# Patient Record
Sex: FEMALE | Race: WHITE | NOT HISPANIC OR LATINO
[De-identification: names, ages, dates, MRNs, and addresses within clinical notes are randomized per-mention and may not be internally consistent; named-entity substitution may affect disease eponyms.]

---

## 2017-09-07 ENCOUNTER — RESULT REVIEW (OUTPATIENT)
Age: 52
End: 2017-09-07

## 2017-09-07 ENCOUNTER — OUTPATIENT (OUTPATIENT)
Dept: OUTPATIENT SERVICES | Facility: HOSPITAL | Age: 52
LOS: 1 days | Discharge: HOME | End: 2017-09-07
Payer: COMMERCIAL

## 2017-09-07 VITALS
DIASTOLIC BLOOD PRESSURE: 87 MMHG | WEIGHT: 147.93 LBS | RESPIRATION RATE: 12 BRPM | TEMPERATURE: 98 F | HEIGHT: 66 IN | OXYGEN SATURATION: 100 % | SYSTOLIC BLOOD PRESSURE: 126 MMHG | HEART RATE: 59 BPM

## 2017-09-07 DIAGNOSIS — Z90.710 ACQUIRED ABSENCE OF BOTH CERVIX AND UTERUS: Chronic | ICD-10-CM

## 2017-09-07 PROCEDURE — 88305 TISSUE EXAM BY PATHOLOGIST: CPT | Mod: 26

## 2017-09-07 RX ORDER — SODIUM CHLORIDE 9 MG/ML
1000 INJECTION INTRAMUSCULAR; INTRAVENOUS; SUBCUTANEOUS
Qty: 0 | Refills: 0 | Status: DISCONTINUED | OUTPATIENT
Start: 2017-09-07 | End: 2017-09-22

## 2017-09-08 LAB — SURGICAL PATHOLOGY FINAL REPORT - CH: SIGNIFICANT CHANGE UP

## 2017-09-14 DIAGNOSIS — Z88.5 ALLERGY STATUS TO NARCOTIC AGENT: ICD-10-CM

## 2017-09-14 DIAGNOSIS — Z85.820 PERSONAL HISTORY OF MALIGNANT MELANOMA OF SKIN: ICD-10-CM

## 2017-09-14 DIAGNOSIS — R19.7 DIARRHEA, UNSPECIFIED: ICD-10-CM

## 2017-09-14 DIAGNOSIS — K92.1 MELENA: ICD-10-CM

## 2017-09-14 DIAGNOSIS — Z87.891 PERSONAL HISTORY OF NICOTINE DEPENDENCE: ICD-10-CM

## 2019-04-11 ENCOUNTER — EMERGENCY (EMERGENCY)
Facility: HOSPITAL | Age: 54
LOS: 0 days | Discharge: HOME | End: 2019-04-11
Attending: EMERGENCY MEDICINE | Admitting: EMERGENCY MEDICINE
Payer: COMMERCIAL

## 2019-04-11 VITALS
SYSTOLIC BLOOD PRESSURE: 119 MMHG | HEIGHT: 66 IN | RESPIRATION RATE: 19 BRPM | HEART RATE: 72 BPM | OXYGEN SATURATION: 100 % | DIASTOLIC BLOOD PRESSURE: 73 MMHG | WEIGHT: 128.97 LBS | TEMPERATURE: 98 F

## 2019-04-11 VITALS — WEIGHT: 128.97 LBS | HEIGHT: 66 IN
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
## 2019-04-11 DIAGNOSIS — R51 HEADACHE: ICD-10-CM

## 2019-04-11 DIAGNOSIS — Z90.710 ACQUIRED ABSENCE OF BOTH CERVIX AND UTERUS: Chronic | ICD-10-CM
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
## 2019-04-11 DIAGNOSIS — Z85.820 PERSONAL HISTORY OF MALIGNANT MELANOMA OF SKIN: ICD-10-CM

## 2019-04-11 DIAGNOSIS — Z79.891 LONG TERM (CURRENT) USE OF OPIATE ANALGESIC: ICD-10-CM

## 2019-04-11 DIAGNOSIS — S00.83XA CONTUSION OF OTHER PART OF HEAD, INITIAL ENCOUNTER: ICD-10-CM

## 2019-04-11 PROBLEM — C43.9 MALIGNANT MELANOMA OF SKIN, UNSPECIFIED: Chronic | Status: ACTIVE | Noted: 2017-09-07

## 2019-04-11 PROCEDURE — 76376 3D RENDER W/INTRP POSTPROCES: CPT | Mod: 26

## 2019-04-11 PROCEDURE — 99283 EMERGENCY DEPT VISIT LOW MDM: CPT

## 2019-04-11 PROCEDURE — 70486 CT MAXILLOFACIAL W/O DYE: CPT | Mod: 26

## 2019-04-11 NOTE — ED PROVIDER NOTE - ENMT, MLM
Airway patent, Nasal mucosa clear. Mouth with normal mucosa. Throat has no vesicles, no oropharyngeal exudates and uvula is midline. +mild nasal swelling, no septal hematoma, +?deviation of nose to the right +dried blood in nares. Neck supple.
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
## 2019-04-11 NOTE — ED ADULT NURSE NOTE - NSIMPLEMENTINTERV_GEN_ALL_ED
Implemented All Universal Safety Interventions:  [place] to call system. Call bell, personal items and telephone within reach. Instruct patient to call for assistance. Room bathroom lighting operational. Non-slip footwear when patient is off stretcher. Physically safe environment: no spills, clutter or unnecessary equipment. Stretcher in lowest position, wheels locked, appropriate side rails in place.

## 2019-04-11 NOTE — ED PROVIDER NOTE - CONSTITUTIONAL, MLM
normal... Well appearing, well nourished, awake, alert, oriented to person, place, time/situation and +mild distress
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
## 2019-04-11 NOTE — ED ADULT TRIAGE NOTE - PRO INTERPRETER NEED 2
Rest, limit activity  Ice to injured areas 2 to 3 times a day for 15-20 minutes  Antibiotic ointment to abrasions  Tylenol, or ibuprofen (Advil/Motrin), or try Aleve (please take with food) as needed  Comfortable footwear  Use Ace wrap while awake  Use crutches or cane to assist ambulation  Recheck/follow-up with family physician/Orthopedics  Milly Portillo  [phone]    Please go to the hospital emergency department if needed 
English

## 2019-04-11 NOTE — ED PROVIDER NOTE - NSFOLLOWUPINSTRUCTIONS_ED_ALL_ED_FT
Follow-up with your regular physician as necessary   Acetaminophen or ibuprofen for pain  Ice may help for the first 24-48h  Return with any recurrent/worsening symptoms.

## 2019-12-11 ENCOUNTER — APPOINTMENT (OUTPATIENT)
Dept: DERMATOLOGY | Facility: CLINIC | Age: 54
End: 2019-12-11

## 2020-01-05 ENCOUNTER — TRANSCRIPTION ENCOUNTER (OUTPATIENT)
Age: 55
End: 2020-01-05

## 2020-05-22 NOTE — ASU PATIENT PROFILE, ADULT - MEDICATIONS BROUGHT TO HOSPITAL, PROFILE
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 Infectious work up has been negative. Refractory to steroids. Plan to start Rituxan.  -Hematology - Recommending PRBC and planning on Rituxan tomorrow; Labs and CT chest without contrast to evaluate for lymphadenopathy; continue folic acid. no

## 2020-07-29 ENCOUNTER — TRANSCRIPTION ENCOUNTER (OUTPATIENT)
Age: 55
End: 2020-07-29

## 2020-08-22 ENCOUNTER — EMERGENCY (EMERGENCY)
Facility: HOSPITAL | Age: 55
LOS: 0 days | Discharge: HOME | End: 2020-08-22
Attending: EMERGENCY MEDICINE
Payer: COMMERCIAL

## 2020-08-22 VITALS
RESPIRATION RATE: 18 BRPM | DIASTOLIC BLOOD PRESSURE: 96 MMHG | HEART RATE: 77 BPM | TEMPERATURE: 99 F | OXYGEN SATURATION: 99 % | SYSTOLIC BLOOD PRESSURE: 164 MMHG

## 2020-08-22 VITALS — HEIGHT: 66 IN | WEIGHT: 160.06 LBS

## 2020-08-22 DIAGNOSIS — S61.012A LACERATION WITHOUT FOREIGN BODY OF LEFT THUMB WITHOUT DAMAGE TO NAIL, INITIAL ENCOUNTER: ICD-10-CM

## 2020-08-22 DIAGNOSIS — Z88.5 ALLERGY STATUS TO NARCOTIC AGENT: ICD-10-CM
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
## 2020-08-22 DIAGNOSIS — Z90.710 ACQUIRED ABSENCE OF BOTH CERVIX AND UTERUS: Chronic | ICD-10-CM

## 2020-08-22 DIAGNOSIS — Z85.820 PERSONAL HISTORY OF MALIGNANT MELANOMA OF SKIN: ICD-10-CM

## 2020-08-22 PROCEDURE — 99282 EMERGENCY DEPT VISIT SF MDM: CPT

## 2020-08-22 NOTE — ED STATDOCS - NSFOLLOWUPINSTRUCTIONS_ED_ALL_ED_FT
Take tylenol 650mg every 6 hours as needed for pain.  Keep hand elevated as much as possible.  Keep dressing in place and keep dry.  Have your doctor check wound in 2-3 days.          SKIN AVULSION - AfterCare(R) Instructions(ER/ED)     Skin Avulsion    WHAT YOU NEED TO KNOW:    Skin avulsion is a wound that happens when skin is torn from your body during an accident or other injury. The torn skin may be lost or too damaged to be repaired, and it must be removed. A wound of this type cannot be stitched closed because there is tissue missing. Avulsion wounds are usually bigger and have more scars because of the missing tissue.    DISCHARGE INSTRUCTIONS:    Medicines:     Antibiotic ointment: Your healthcare provider may tell you to gently rub a topical antibiotic ointment on your wound. This will help prevent an infection and help your wound heal faster.       Pain medicine: You may be given medicine to take away or decrease pain. Do not wait until the pain is severe before you take your medicine.      NSAIDs, such as ibuprofen, help decrease swelling, pain, and fever. This medicine is available with or without a doctor's order. NSAIDs can cause stomach bleeding or kidney problems in certain people. If you take blood thinner medicine, always ask if NSAIDs are safe for you. Always read the medicine label and follow directions. Do not give these medicines to children under 6 months of age without direction from your child's healthcare provider.      Take your medicine as directed. Contact your healthcare provider if you think your medicine is not helping or if you have side effects. Tell him of her if you are allergic to any medicine. Keep a list of the medicines, vitamins, and herbs you take. Include the amounts, and when and why you take them. Bring the list or the pill bottles to follow-up visits. Carry your medicine list with you in case of an emergency.    Care for your wound: Avulsion wounds may take longer to heal because they cannot be closed with tape or stitches. Keep your wound clean and protected to prevent infection and speed healing.     Clean your wound: Wash your hands with soap and water before and after you care for your wound. You may be able to use a soft cloth to gently clean the wound after the first 24 to 48 hours. After that, gently clean the wound once or twice a day with cool water. Do not soak your wound. Use soap to clean around the wound, but try not to get any on the wound itself. Do not use alcohol or hydrogen peroxide to clean your wound unless you are directed to. Gently pat the area dry and reapply the bandage as directed.       Elevate your wound: Prop your injured area on pillows to raise it above the level of your heart. This will help reduce pain and swelling. Do this for 30 minutes at a time, as often as you can.       Bandage your wound: Bandages keep your wound clean, dry, and protected from infection. They may also prevent swelling. Use a bandage that does not stick to your wound, and has a spongy layer to absorb fluids. Leave your bandage on as long as directed. Ask your healthcare provider when and how to change your bandage. Do not wrap the bandage too tightly. This could cut off blood flow and cause more injury.       Use cool compresses: Wet a washcloth or towel with cool water and hold it on your wound as directed. Ask how often to apply the compress and for how long each time.      Reduce scarring: Avoid direct sunlight on your wound. Sunlight may burn or change the color of the new skin over your wound. Use sunscreen (SPF 30 or higher) on the new skin for at least 1 year after it heals.    Support for leg and arm wounds: You may need to use crutches if the wound is on your leg. You may need to use a sling if the wound is on your arm. Crutches and slings help protect the injured area, prevent further injury, and heal the area in the right position.     Follow up with your healthcare provider within 2 days or as directed: If you have stitches, ask when to return to have them removed. Write down your questions so you remember to ask them during your visits.     Contact your healthcare provider if:     You have new pain, or it gets worse.       You have trouble moving the injured body area.       Your wound splits open or does not seem to be healing.    Return to the emergency department if:     You have a fever.       You have painful swelling, redness, or warmth around your wound.      Your wound is red and there are red streaks on your skin starting at your wound and moving upward.       Your wound is draining pus.       You have heavy bleeding or bleeding that does not stop after 10 minutes of holding firm, direct pressure over the wound.      You feel like there is an object stuck in your wound.

## 2020-08-22 NOTE — ED ADULT NURSE NOTE - NSIMPLEMENTINTERV_GEN_ALL_ED
Pt will ask psychiatrist re: increasing Wellbutrin dose 100 to 150 mg 2x/day and ADD medication (Strattera).   
Implemented All Universal Safety Interventions:  [place] to call system. Call bell, personal items and telephone within reach. Instruct patient to call for assistance. Room bathroom lighting operational. Non-slip footwear when patient is off stretcher. Physically safe environment: no spills, clutter or unnecessary equipment. Stretcher in lowest position, wheels locked, appropriate side rails in place.

## 2020-08-22 NOTE — ED STATDOCS - ATTENDING CONTRIBUTION TO CARE
Attending Contribution to Care: I, Talya Rodriguez, performed the initial face to face bedside interview with this patient regarding history of present illness, review of symptoms and relevant past medical, social and family history.  I completed an independent physical examination.  I was the initial provider who evaluated this patient. I have signed out the follow up of any pending tests (i.e. labs, radiological studies) to the ACP.  I have communicated the patient’s plan of care and disposition with the ACP.

## 2020-08-22 NOTE — ED STATDOCS - PATIENT PORTAL LINK FT
You can access the FollowMyHealth Patient Portal offered by [facility] by registering at the following website: [email or web address]. By joining [employer]’s FollowMyHealth portal, you will also be able to view your health information using other applications (apps) compatible with our system.

## 2020-08-22 NOTE — ED STATDOCS - OBJECTIVE STATEMENT
55 y/o female presents to ED with laceration with razor on left thumb. Pt states she was switching razor blades and pushed hard onto the razor, resulting in laceration to left thumb. Last tetanus shot 5 years ago.

## 2020-08-22 NOTE — ED ADULT NURSE NOTE - OBJECTIVE STATEMENT
Patient states that she has laceration to left thumb cut with razor approximately 2 hours ago.  Patient is still currently bleeding.

## 2020-08-22 NOTE — ED STATDOCS - SKIN, MLM
skin normal color for race, warm, dry and intact. +radial side of left thumb over distal phalanx subcentimeter deep skin avulsion with active bleeding, circular

## 2020-08-22 NOTE — ED STATDOCS - CLINICAL SUMMARY MEDICAL DECISION MAKING FREE TEXT BOX
deep skin avulsion from razor, localized wound care and outpt wound check recommended, pt already UTD on vaccinations, pt denies pain medications as she is going out after.

## 2020-08-22 NOTE — ED STATDOCS - PROGRESS NOTE DETAILS
55 y/o Female with skin avulsion to left thumb s/p trying to change the blade on her razor tonight.  avulsion to medial tip of left thumb still bleeding.  Applied Surgifoam to area and applied pressure dressing.  Will elevate.  F/U in 2-3 days for wound re-eval./ dressing removal.  Mable Treadwell PA-C

## 2020-10-15 ENCOUNTER — EMERGENCY (EMERGENCY)
Facility: HOSPITAL | Age: 55
LOS: 0 days | Discharge: HOME | End: 2020-10-15
Payer: COMMERCIAL

## 2020-10-15 VITALS
RESPIRATION RATE: 16 BRPM | TEMPERATURE: 99 F | HEIGHT: 66 IN | OXYGEN SATURATION: 97 % | HEART RATE: 65 BPM | DIASTOLIC BLOOD PRESSURE: 62 MMHG | SYSTOLIC BLOOD PRESSURE: 113 MMHG

## 2020-10-15 DIAGNOSIS — R53.83 OTHER FATIGUE: ICD-10-CM

## 2020-10-15 DIAGNOSIS — Z88.5 ALLERGY STATUS TO NARCOTIC AGENT: ICD-10-CM

## 2020-10-15 DIAGNOSIS — Z90.710 ACQUIRED ABSENCE OF BOTH CERVIX AND UTERUS: Chronic | ICD-10-CM

## 2020-10-15 PROCEDURE — U0003: CPT

## 2020-10-15 PROCEDURE — 99283 EMERGENCY DEPT VISIT LOW MDM: CPT

## 2020-10-15 NOTE — ED STATDOCS - OBJECTIVE STATEMENT
Pt presents to ED with fatigue x 2 days. pt denies any chest pain, sob, dyspnea, abdominal pain, nausea, vomiting or any other complaints.   Pt concerned for possible COVID-19.   Pt here for testing.

## 2020-10-15 NOTE — ED STATDOCS - CLINICAL SUMMARY MEDICAL DECISION MAKING FREE TEXT BOX
patient presents with fatigue and concern for COVID exposure.  As patient is nontoxic appearing will test for COVID and d/c.  Quarantine reviewed and return precautions reviewed.

## 2020-10-15 NOTE — ED STATDOCS - PATIENT PORTAL LINK FT
You can access the FollowMyHealth Patient Portal offered by [facility] by registering at the following website: [email or web address]. By joining [employer]’s FollowMyHealth portal, you will also be able to view your health information using other applications (apps) compatible with our system.

## 2020-10-15 NOTE — ED STATDOCS - PHYSICAL EXAMINATION
Constitutional: NAD AAOx3. Nontoxic, well appearing. Speaking full sentences  w/o distress  Eyes: EOMI, pupils equal  Head: Normocephalic atraumatic  Mouth: no airway obstruction  Cardiac: [id number]   Resp: Lungs CTAB  GI: Abd s/nt/nd  Neuro: motor and sensory intact

## 2020-10-15 NOTE — ED STATDOCS - NS ED ROS FT
ROS:   Constitutional- no fever, no chills.  + fatigue  ENT- no rhinorrhea, no sore throat, no congestion.    Cardiac- no chest pain, no palpitations,   Respiratory- no cough, no SOB    Abdomen- No nausea, no vomiting, no diarrhea.    Urinary- no dysuria, no urgency, no frequency.    Skin- No rashes

## 2020-10-16 LAB — SARS-COV-2 RNA SPEC QL NAA+PROBE: SIGNIFICANT CHANGE UP

## 2021-03-17 ENCOUNTER — OUTPATIENT (OUTPATIENT)
Dept: OUTPATIENT SERVICES | Facility: HOSPITAL | Age: 56
LOS: 1 days | End: 2021-03-17
Payer: COMMERCIAL

## 2021-03-17 DIAGNOSIS — Z20.828 CONTACT WITH AND (SUSPECTED) EXPOSURE TO OTHER VIRAL COMMUNICABLE DISEASES: ICD-10-CM

## 2021-03-17 DIAGNOSIS — Z90.710 ACQUIRED ABSENCE OF BOTH CERVIX AND UTERUS: Chronic | ICD-10-CM

## 2021-03-17 LAB — SARS-COV-2 RNA SPEC QL NAA+PROBE: DETECTED

## 2021-03-17 PROCEDURE — C9803: CPT

## 2021-03-17 PROCEDURE — U0005: CPT

## 2021-03-17 PROCEDURE — U0003: CPT

## 2021-03-18 DIAGNOSIS — Z20.828 CONTACT WITH AND (SUSPECTED) EXPOSURE TO OTHER VIRAL COMMUNICABLE DISEASES: ICD-10-CM

## 2021-04-05 NOTE — ED STATDOCS - NSTIMEPROVIDERCAREINITIATE_GEN_ER
Zheneliseo Guillory may take hydroxzyzine 10mg every 8 hours as needed for racing thoughts. Followup with the psychology resources provided by [facility]. 22-Aug-2020 20:02

## 2021-04-12 ENCOUNTER — TRANSCRIPTION ENCOUNTER (OUTPATIENT)
Age: 56
End: 2021-04-12

## 2021-06-13 ENCOUNTER — EMERGENCY (EMERGENCY)
Facility: HOSPITAL | Age: 56
LOS: 0 days | Discharge: HOME | End: 2021-06-13
Attending: EMERGENCY MEDICINE
Payer: COMMERCIAL

## 2021-06-13 VITALS
SYSTOLIC BLOOD PRESSURE: 127 MMHG | HEART RATE: 65 BPM | OXYGEN SATURATION: 97 % | RESPIRATION RATE: 18 BRPM | TEMPERATURE: 98 F | DIASTOLIC BLOOD PRESSURE: 82 MMHG

## 2021-06-13 VITALS — WEIGHT: 162.04 LBS | HEIGHT: 66 IN

## 2021-06-13 DIAGNOSIS — R10.32 LEFT LOWER QUADRANT PAIN: ICD-10-CM
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
## 2021-06-13 DIAGNOSIS — Z85.820 PERSONAL HISTORY OF MALIGNANT MELANOMA OF SKIN: ICD-10-CM

## 2021-06-13 DIAGNOSIS — Z88.4 ALLERGY STATUS TO ANESTHETIC AGENT: ICD-10-CM

## 2021-06-13 DIAGNOSIS — Z90.710 ACQUIRED ABSENCE OF BOTH CERVIX AND UTERUS: Chronic | ICD-10-CM
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
## 2021-06-13 DIAGNOSIS — T81.49XA INFECTION FOLLOWING A PROCEDURE, OTHER SURGICAL SITE, INITIAL ENCOUNTER: ICD-10-CM

## 2021-06-13 DIAGNOSIS — E06.3 AUTOIMMUNE THYROIDITIS: ICD-10-CM

## 2021-06-13 LAB
ALBUMIN SERPL ELPH-MCNC: 4.1 G/DL — SIGNIFICANT CHANGE UP (ref 3.3–5)
ALP SERPL-CCNC: 67 U/L — SIGNIFICANT CHANGE UP (ref 40–120)
ALT FLD-CCNC: 40 U/L — SIGNIFICANT CHANGE UP (ref 12–78)
ANION GAP SERPL CALC-SCNC: 3 MMOL/L — LOW (ref 5–17)
APPEARANCE UR: CLEAR — SIGNIFICANT CHANGE UP
APTT BLD: 30.9 SEC — SIGNIFICANT CHANGE UP (ref 27.5–35.5)
AST SERPL-CCNC: 22 U/L — SIGNIFICANT CHANGE UP (ref 15–37)
BASOPHILS # BLD AUTO: 0.04 K/UL — SIGNIFICANT CHANGE UP (ref 0–0.2)
BASOPHILS NFR BLD AUTO: 0.7 % — SIGNIFICANT CHANGE UP (ref 0–2)
BILIRUB SERPL-MCNC: 0.3 MG/DL — SIGNIFICANT CHANGE UP (ref 0.2–1.2)
BILIRUB UR-MCNC: NEGATIVE — SIGNIFICANT CHANGE UP
BUN SERPL-MCNC: 21 MG/DL — SIGNIFICANT CHANGE UP (ref 7–23)
CALCIUM SERPL-MCNC: 9.3 MG/DL — SIGNIFICANT CHANGE UP (ref 8.5–10.1)
CHLORIDE SERPL-SCNC: 108 MMOL/L — SIGNIFICANT CHANGE UP (ref 96–108)
CO2 SERPL-SCNC: 30 MMOL/L — SIGNIFICANT CHANGE UP (ref 22–31)
COLOR SPEC: YELLOW — SIGNIFICANT CHANGE UP
CREAT SERPL-MCNC: 0.64 MG/DL — SIGNIFICANT CHANGE UP (ref 0.5–1.3)
DIFF PNL FLD: ABNORMAL
EOSINOPHIL # BLD AUTO: 0.32 K/UL — SIGNIFICANT CHANGE UP (ref 0–0.5)
EOSINOPHIL NFR BLD AUTO: 5.8 % — SIGNIFICANT CHANGE UP (ref 0–6)
GLUCOSE SERPL-MCNC: 69 MG/DL — LOW (ref 70–99)
GLUCOSE UR QL: NEGATIVE MG/DL — SIGNIFICANT CHANGE UP
HCG SERPL-ACNC: 2 MIU/ML — SIGNIFICANT CHANGE UP
HCT VFR BLD CALC: 38.6 % — SIGNIFICANT CHANGE UP (ref 34.5–45)
HGB BLD-MCNC: 12.3 G/DL — SIGNIFICANT CHANGE UP (ref 11.5–15.5)
IMM GRANULOCYTES NFR BLD AUTO: 0.2 % — SIGNIFICANT CHANGE UP (ref 0–1.5)
INR BLD: 1.04 RATIO — SIGNIFICANT CHANGE UP (ref 0.88–1.16)
KETONES UR-MCNC: NEGATIVE — SIGNIFICANT CHANGE UP
LEUKOCYTE ESTERASE UR-ACNC: NEGATIVE — SIGNIFICANT CHANGE UP
LYMPHOCYTES # BLD AUTO: 1.71 K/UL — SIGNIFICANT CHANGE UP (ref 1–3.3)
LYMPHOCYTES # BLD AUTO: 30.9 % — SIGNIFICANT CHANGE UP (ref 13–44)
MCHC RBC-ENTMCNC: 29.5 PG — SIGNIFICANT CHANGE UP (ref 27–34)
MCHC RBC-ENTMCNC: 31.9 GM/DL — LOW (ref 32–36)
MCV RBC AUTO: 92.6 FL — SIGNIFICANT CHANGE UP (ref 80–100)
MONOCYTES # BLD AUTO: 0.56 K/UL — SIGNIFICANT CHANGE UP (ref 0–0.9)
MONOCYTES NFR BLD AUTO: 10.1 % — SIGNIFICANT CHANGE UP (ref 2–14)
NEUTROPHILS # BLD AUTO: 2.89 K/UL — SIGNIFICANT CHANGE UP (ref 1.8–7.4)
NEUTROPHILS NFR BLD AUTO: 52.3 % — SIGNIFICANT CHANGE UP (ref 43–77)
NITRITE UR-MCNC: NEGATIVE — SIGNIFICANT CHANGE UP
PH UR: 6 — SIGNIFICANT CHANGE UP (ref 5–8)
PLATELET # BLD AUTO: 268 K/UL — SIGNIFICANT CHANGE UP (ref 150–400)
POTASSIUM SERPL-MCNC: 3.6 MMOL/L — SIGNIFICANT CHANGE UP (ref 3.5–5.3)
POTASSIUM SERPL-SCNC: 3.6 MMOL/L — SIGNIFICANT CHANGE UP (ref 3.5–5.3)
PROT SERPL-MCNC: 8.1 GM/DL — SIGNIFICANT CHANGE UP (ref 6–8.3)
PROT UR-MCNC: NEGATIVE MG/DL — SIGNIFICANT CHANGE UP
PROTHROM AB SERPL-ACNC: 12 SEC — SIGNIFICANT CHANGE UP (ref 10.6–13.6)
RBC # BLD: 4.17 M/UL — SIGNIFICANT CHANGE UP (ref 3.8–5.2)
RBC # FLD: 13.1 % — SIGNIFICANT CHANGE UP (ref 10.3–14.5)
SODIUM SERPL-SCNC: 141 MMOL/L — SIGNIFICANT CHANGE UP (ref 135–145)
SP GR SPEC: 1.01 — SIGNIFICANT CHANGE UP (ref 1.01–1.02)
UROBILINOGEN FLD QL: NEGATIVE MG/DL — SIGNIFICANT CHANGE UP
WBC # BLD: 5.53 K/UL — SIGNIFICANT CHANGE UP (ref 3.8–10.5)
WBC # FLD AUTO: 5.53 K/UL — SIGNIFICANT CHANGE UP (ref 3.8–10.5)

## 2021-06-13 PROCEDURE — 96374 THER/PROPH/DIAG INJ IV PUSH: CPT

## 2021-06-13 PROCEDURE — 86901 BLOOD TYPING SEROLOGIC RH(D): CPT

## 2021-06-13 PROCEDURE — 80053 COMPREHEN METABOLIC PANEL: CPT

## 2021-06-13 PROCEDURE — 74177 CT ABD & PELVIS W/CONTRAST: CPT

## 2021-06-13 PROCEDURE — 85025 COMPLETE CBC W/AUTO DIFF WBC: CPT

## 2021-06-13 PROCEDURE — 36415 COLL VENOUS BLD VENIPUNCTURE: CPT

## 2021-06-13 PROCEDURE — 99284 EMERGENCY DEPT VISIT MOD MDM: CPT

## 2021-06-13 PROCEDURE — 87086 URINE CULTURE/COLONY COUNT: CPT

## 2021-06-13 PROCEDURE — 85610 PROTHROMBIN TIME: CPT

## 2021-06-13 PROCEDURE — 85730 THROMBOPLASTIN TIME PARTIAL: CPT

## 2021-06-13 PROCEDURE — 86850 RBC ANTIBODY SCREEN: CPT

## 2021-06-13 PROCEDURE — 86900 BLOOD TYPING SEROLOGIC ABO: CPT

## 2021-06-13 PROCEDURE — 99284 EMERGENCY DEPT VISIT MOD MDM: CPT | Mod: 25

## 2021-06-13 PROCEDURE — 74177 CT ABD & PELVIS W/CONTRAST: CPT | Mod: 26,MA

## 2021-06-13 PROCEDURE — 81001 URINALYSIS AUTO W/SCOPE: CPT

## 2021-06-13 PROCEDURE — 84702 CHORIONIC GONADOTROPIN TEST: CPT

## 2021-06-13 RX ORDER — HYDROMORPHONE HYDROCHLORIDE 2 MG/ML
1 INJECTION INTRAMUSCULAR; INTRAVENOUS; SUBCUTANEOUS ONCE
Refills: 0 | Status: DISCONTINUED | OUTPATIENT
Start: 2021-06-13 | End: 2021-06-13

## 2021-06-13 RX ORDER — LINEZOLID 600 MG/300ML
1 INJECTION, SOLUTION INTRAVENOUS
Qty: 20 | Refills: 0
Start: 2021-06-13 | End: 2021-06-22

## 2021-06-13 RX ORDER — CIPROFLOXACIN LACTATE 400MG/40ML
1 VIAL (ML) INTRAVENOUS
Qty: 20 | Refills: 0
Start: 2021-06-13 | End: 2021-06-22

## 2021-06-13 RX ORDER — SODIUM CHLORIDE 9 MG/ML
1000 INJECTION INTRAMUSCULAR; INTRAVENOUS; SUBCUTANEOUS ONCE
Refills: 0 | Status: COMPLETED | OUTPATIENT
Start: 2021-06-13 | End: 2021-06-13

## 2021-06-13 RX ADMIN — SODIUM CHLORIDE 1000 MILLILITER(S): 9 INJECTION INTRAMUSCULAR; INTRAVENOUS; SUBCUTANEOUS at 14:35

## 2021-06-13 RX ADMIN — HYDROMORPHONE HYDROCHLORIDE 1 MILLIGRAM(S): 2 INJECTION INTRAMUSCULAR; INTRAVENOUS; SUBCUTANEOUS at 14:35

## 2021-06-13 NOTE — ED STATDOCS - CLINICAL SUMMARY MEDICAL DECISION MAKING FREE TEXT BOX
[facility]   [phone]  RENAL FOLLOW UP NOTE  --------------------------------------------------------------------------------  Chief Complaint:    24 hour events/subjective:    States feels better.  Has been on Lasix drip.  States breathing is better.  States left leg is slightly better in terms of pain.    PAST HISTORY  --------------------------------------------------------------------------------  No significant changes to PMH, PSH, FHx, SHx, unless otherwise noted    ALLERGIES & MEDICATIONS  --------------------------------------------------------------------------------  Allergies    Allergy Status Unknown    Intolerances      Standing Inpatient Medications  ALBUTerol/ipratropium for Nebulization 3 milliLiter(s) Nebulizer every 6 hours  allopurinol 100 milliGRAM(s) Oral daily  aspirin enteric coated 81 milliGRAM(s) Oral daily  atorvastatin 40 milliGRAM(s) Oral at bedtime  ceFAZolin   IVPB 1000 milliGRAM(s) IV Intermittent every 8 hours  chlorhexidine 2% Cloths 1 Application(s) Topical <User Schedule>  colchicine 0.6 milliGRAM(s) Oral daily  dextrose 5%. 1000 milliLiter(s) IV Continuous <Continuous>  dextrose 50% Injectable 12.5 Gram(s) IV Push once  dextrose 50% Injectable 25 Gram(s) IV Push once  dextrose 50% Injectable 25 Gram(s) IV Push once  DOBUTamine Infusion 5 MICROgram(s)/kG/Min IV Continuous <Continuous>  docusate sodium 100 milliGRAM(s) Oral three times a day  furosemide Infusion 2.5 mG/Hr IV Continuous <Continuous>  heparin  Infusion 600 Unit(s)/Hr IV Continuous <Continuous>  insulin lispro (HumaLOG) corrective regimen sliding scale   SubCutaneous three times a day before meals  insulin lispro (HumaLOG) corrective regimen sliding scale   SubCutaneous at bedtime  magnesium sulfate  IVPB 1 Gram(s) IV Intermittent once  pantoprazole  Injectable 40 milliGRAM(s) IV Push daily  polyethylene glycol 3350 17 Gram(s) Oral daily  potassium chloride  10 mEq/50 mL IVPB 10 milliEquivalent(s) IV Intermittent once  senna 2 Tablet(s) Oral at bedtime  sodium chloride 0.9% lock flush 3 milliLiter(s) IV Push every 8 hours  spironolactone 25 milliGRAM(s) Oral every 12 hours  vasopressin Infusion 0.033 Unit(s)/Min IV Continuous <Continuous>    PRN Inpatient Medications  acetaminophen   Tablet .. 650 milliGRAM(s) Oral every 6 hours PRN  dextrose 40% Gel 15 Gram(s) Oral once PRN  glucagon  Injectable 1 milliGRAM(s) IntraMuscular once PRN      REVIEW OF SYSTEMS  --------------------------------------------------------------------------------    Gen: denies , fevers/chills,  CVS: denies chest pain/palpitations  Resp: denies Worsening SOB/Cough  GI: Denies N/V/Abd pain  : Denies dysuria    All other systems were reviewed and are negative, except as noted.    VITALS/PHYSICAL EXAM  --------------------------------------------------------------------------------  T(C): 37 (07-12-19 @ 08:00), Max: 37.8 (07-11-19 @ 14:00)  HR: 112 (07-12-19 @ 10:00) (76 - 112)  BP: 98/56 (07-11-19 @ 16:15) (91/70 - 111/57)  RR: 19 (07-12-19 @ 10:00) (7 - 43)  SpO2: 100% (07-12-19 @ 10:00) (69% - 100%)  Wt(kg): --  Height (cm): 187.96 (07-10-19 @ 11:57)  Weight (kg): 129.8 (07-10-19 @ 11:57)  BMI (kg/m2): 36.7 (07-10-19 @ 11:57)  BSA (m2): 2.53 (07-10-19 @ 11:57)      07-11-19 @ 07:01  -  07-12-19 @ 07:00  --------------------------------------------------------  IN: 2656.1 mL / OUT: 5990 mL / NET: -3333.9 mL    07-12-19 @ 07:01  -  07-12-19 @ 10:08  --------------------------------------------------------  IN: 389.4 mL / OUT: 875 mL / NET: -485.6 mL      Physical Exam:  	  Gen: alert and oriented. Significantly more interactive  	no jvd ,  	Pulm: decrease bs  no rales or ronchi or wheezing  	CV: RRR, S1S2; no rub  		Abd: +BS, soft, nontender/nondistended  	: No suprapubic tenderness  	UE: Warm, no cyanosis  no clubbing,  no edema  	LE: Warm, no cyanosis  no clubbing, 2+  edema tender calf medial and posterior aspect	Neuro:  Alert and oriented  		Skin: Warm and erythema left leg medial and posterior mid calf area         LABS/STUDIES  --------------------------------------------------------------------------------              11.7   14.7  >-----------<  143      [07-11-19 @ 23:48]              35.0     139  |  105  |  40  ----------------------------<  200      [07-11-19 @ 23:48]  3.8   |  24  |  2.24        Ca     8.8     [07-11-19 @ 23:48]      Mg     2.3     [07-11-19 @ 23:48]      Phos  3.6     [07-11-19 @ 23:48]    TPro  6.9  /  Alb  3.4  /  TBili  1.3  /  DBili  x   /  AST  14  /  ALT  19  /  AlkPhos  94  [07-11-19 @ 23:48]    PT/INR: PT 25.8 , INR 2.21       [07-12-19 @ 04:59]  PTT: 38.3       [07-12-19 @ 04:59]    Uric acid 9.8      [07-11-19 @ 23:48]  CK 57      [07-12-19 @ 08:31]    Creatinine Trend:  SCr 2.24 [07-11 @ 23:48]  SCr 2.13 [07-11 @ 01:37]  SCr 2.19 [07-10 @ 14:19]              Urinalysis - [07-10-19 @ 17:50]      Color Light Yellow / Appearance Clear / SG 1.011 / pH 6.5      Gluc Negative / Ketone Negative  / Bili Negative / Urobili Negative       Blood Trace / Protein Trace / Leuk Est Large / Nitrite Negative      RBC 2 / WBC 34 / Hyaline 0 / Gran  / Sq Epi  / Non Sq Epi 0 / Bacteria Few      HbA1c 7.5      [07-10-19 @ 18:11]  TSH 3.63      [07-10-19 @ 19:44] 56 y/o female with abd pain flank pain, CT, labs,

## 2021-06-13 NOTE — ED ADULT NURSE NOTE - CHIEF COMPLAINT QUOTE
c/o left lower abdominal pain radiating to left hip and mid lateral abdominal for past week, s/p abdominoplasty 4 weeks ago, finish dose antibiotics (bactrim) for incisional infection, decrease mobility due to pain HX: hashimotos thyroiditis

## 2021-06-13 NOTE — ED STATDOCS - ATTENDING CONTRIBUTION TO CARE
I, Anjelica Mendoza MD, performed the initial face to face bedside interview with this patient regarding history of present illness, review of symptoms and relevant past medical, social and family history.  I completed an independent physical examination.  I was the initial provider who evaluated this patient. I have signed out the follow up of any pending tests (i.e. labs, radiological studies) to the ACP.  I have communicated the patient’s plan of care and disposition with the ACP.  The history, relevant review of systems, past medical and surgical history, medical decision making, and physical examination was documented by the scribe in my presence and I attest to the accuracy of the documentation.

## 2021-06-13 NOTE — ED STATDOCS - CPE ED NEURO NORM
"Subjective   Leilani Pérez is a 57 y.o. female   Chief Complaint   Patient presents with   • Follow-up     patient states she had a pelvic ultrasound a couple yrs ago, and was told \"intro wall wa thicker\"         History of Present Illness  Reports 5 years of dyspareunia.  Previously seen by Dr. Power and was seen at [facility].    The pain is similar in intensity over the last five years.    Was told in the past she had diverticulitis.  Has bowel movements about every 2-3 days.   Dr. Givens does other routine health maintenance including pap smears, no h/o abnormal pap smears.   Reports that there are some sexual positions that hurt less.  She was told she had an ovarian cyst and thought that might be cause of pain    Also, was told she had interstitial cystitis at [facility] 10 to 15 years ago; often has blood in urine when she has urinalysis done.  Is going to follow up with Urology.    Does not recall having trigger point injections but does think she had some sort of biopsy at [facility].  Was also given a pill of some sort but she cannot recall the name.  The bladder cocktail was the most helpful procedure she had at that time.  The pain she is currently experiencing feels different.  Feels that her IC is well controlled with dietary changes.      Past Medical History:   Diagnosis Date   • Heart disease    • Hyperlipidemia    • Hypertension    • Seasonal allergies      Past Surgical History:   Procedure Laterality Date   • CARDIAC CATHETERIZATION       Social History   Substance Use Topics   • Smoking status: Former Smoker     Packs/day: 0.50     Years: 3.00   • Smokeless tobacco: Never Used   • Alcohol use Yes      Comment: Occasional     Family History   Problem Relation Age of Onset   • Heart disease Mother    • Stroke Mother    • Cancer Mother      breast   • Breast cancer Mother    • Heart disease Father    • Cancer Father      skin   • Diabetes Father    • Hypertension " "Sister    • Cancer Sister      breast   • Breast cancer Sister    • Hypertension Brother    • Diabetes Brother    • Cancer Paternal Grandfather      lung   • Hypertension Paternal Grandmother    • Diabetes Paternal Grandmother    • Diabetes Sister    • Hypertension Sister    • Ovarian cancer Neg Hx    • Uterine cancer Neg Hx    • Colon cancer Neg Hx          Review of Systems   Constitutional: Negative for chills, fever and unexpected weight change.   HENT: Negative for congestion, nosebleeds and sore throat.    Eyes: Negative for visual disturbance.   Respiratory: Negative for cough, chest tightness and shortness of breath.    Cardiovascular: Negative for chest pain and palpitations.   Gastrointestinal: Negative for abdominal pain, constipation, diarrhea, nausea and vomiting.   Endocrine: Negative for polyuria.   Genitourinary: Positive for dyspareunia, hematuria (not gross, reports \"always positive on urine dips\") and pelvic pain. Negative for difficulty urinating, dysuria, frequency, genital sores, menstrual problem, urgency, vaginal bleeding, vaginal discharge and vaginal pain.   Musculoskeletal: Negative for arthralgias and joint swelling.   Skin: Negative for color change and rash.   Neurological: Negative for dizziness, light-headedness and headaches.   Hematological: Does not bruise/bleed easily.   Psychiatric/Behavioral: Negative for dysphoric mood. The patient is not nervous/anxious.        Objective    /73   Pulse 69   Ht 160 cm (62.99\")   Wt 85.3 kg (188 lb)   BMI 33.31 kg/m²    Physical Exam   Constitutional: She is oriented to person, place, and time. She appears well-developed and well-nourished. No distress.   HENT:   Head: Normocephalic and atraumatic.   Eyes: EOM are normal. No scleral icterus.   Pulmonary/Chest: Effort normal and breath sounds normal.   Abdominal: There is no tenderness.   Genitourinary: There is no rash, tenderness, lesion or injury on the right labia. There is no rash, " tenderness, lesion or injury on the left labia. Cervix exhibits no motion tenderness, no discharge and no friability. Right adnexum displays no mass, no tenderness and no fullness. Left adnexum displays no mass, no tenderness and no fullness. There is tenderness (anterior, left > right) in the vagina. No erythema or bleeding in the vagina. No foreign body in the vagina. No signs of injury around the vagina. No vaginal discharge found.   Genitourinary Comments: No levator ani spasm or pain, no SI pain   Neurological: She is alert and oriented to person, place, and time.   Skin: Skin is warm and dry. She is not diaphoretic.   Psychiatric: She has a normal mood and affect. Her behavior is normal. Judgment and thought content normal.         Assessment/Plan   Leilani was seen today for follow-up.    Diagnoses and all orders for this visit:    Dyspareunia, female      Patient counseled on possible etiologies of pain including bowel and bladder problems and MSK problems.  Will get ultrasound to rule out pelvic pathology.  Recommend bowel regimen to improve constipation.  Palpation of anterior vaginal wall Left>Right initiates pain, so can consider Urogyn referral as patient was previously diagnosed with IC and had relief from bladder installations.      Routine health maintenances - per PCP. Reports up to date pap and mammogram.       I spent at least 10 minutes of 15 minute visit in face-to-face counseling on diagnosis, work up and treatment options.                normal...

## 2021-06-13 NOTE — ED STATDOCS - OBJECTIVE STATEMENT
54 y/o female with a PMHx of Melanoma, Hashimoto's thyroiditis, presents to the ED c/o LLQ abdominal pain. Pt s/p tummy tuck, abdominoplasty with Dr. David Telles 4 weeks ago, stitches were removed and finished 10 day course of Bactrim on Wednesday for possible incisional infection. Pain is located to incision sites, has worsened x 4 days, exacerbates with movement.

## 2021-06-13 NOTE — ED ADULT NURSE NOTE - OBJECTIVE STATEMENT
c/o left lower abdominal pain radiating to left hip and mid lateral abdominal for past week, s/p abdominoplasty 4 weeks ago, finish dose antibiotics (bactrim) for incisional infection.

## 2021-06-13 NOTE — ED STATDOCS - CARE PLAN
Principal Discharge DX:	Surgical wound infection  Secondary Diagnosis:	Left lower quadrant abdominal pain

## 2021-06-13 NOTE — ED STATDOCS - GASTROINTESTINAL, MLM
abdomen soft, tenderness L lower abdomen, erythema along suture site, and non-distended. Bowel sounds present.

## 2021-06-13 NOTE — ED STATDOCS - PATIENT PORTAL LINK FT
You can access the FollowMyHealth Patient Portal offered by [facility] by registering at the following website: [email or web address]. By joining [facility]’s FollowMyHealth portal, you will also be able to view your health information using other applications (apps) compatible with our system.

## 2021-06-13 NOTE — ED STATDOCS - PROGRESS NOTE DETAILS
Yani Mendoza: Dr. David Telles cell #:  54 yo female with a PSH of tummy tuck 4 weeks ago with Dr. Telles presents with L sided abd pain, has been intermittent after the surgery  and worsening today. Had a massage over the area because was told "that her muscles were healing incorrectly". Small area of redness to the L lateral incisional site. Will check labs, CT, UA, and consult with Dr. Telles (plastics). -Gerald Ho PA-C Spoke with Dr. Telles. Discussed case. States to get the CT to see if there is a fluid collection and call him back with the results. -Gerald Ho PA-C Discussed results with Dr. Telles. No fluid collection noted. Staets to rx pt linezolid 600mg and cipro 500mg BID x 10 days and to amrk off the area so he can see her in the office tomorrow. -Gerald Ho PA-C

## 2021-06-13 NOTE — ED STATDOCS - NSFOLLOWUPINSTRUCTIONS_ED_ALL_ED_FT
Follow up with Dr. Telles for further evaluation tomorrow.   Take the medication as directed.    Return to the ER for any new or other concerns.       Surgical Site Infections    WHAT YOU NEED TO KNOW:    What do I need to know about a surgical site infection? A surgical site infection (SSI) is often caused by bacteria. It may develop 10 days to several weeks after surgery. Without treatment, the infection may spread to deeper tissues or to organs close to the surgery area.     What increases my risk for a SSI?   •Medical conditions that cause a weak immune system such as diabetes, HIV, or cancer      •Medicines that cause a weak immune system such as steroids      •Decreased blood flow to the incision from blocked or narrowed blood vessels      •Smoking or being overweight      •Older age      •Radiation, chemotherapy, or poor nutrition      What are the signs and symptoms of a SSI?   •Fever      •Redness, swelling, and pain near the surgery area      •Red streaks coming from the surgery area      •Blood, fluid, or pus draining from your surgery area      •A foul odor coming from the surgery area      How is a SSI diagnosed?   •Blood tests may show the germ that is causing the infection and give information about your overall health.       •A culture is a sample of fluid or tissue taken from near your incision. It is sent to a lab and tested for the germ that is causing the infection.       •X-ray or CT pictures may be done to look for infection in deeper tissues. You may be given contrast liquid to help the pictures show up better. Tell the healthcare provider if you have ever had an allergic reaction to contrast liquid.       How is a SSI treated?   •Medicines will be given to treat the infection and decrease pain or swelling.      •Wound care may be done to clean your wound and help it heal. A wound vacuum may also be placed over your wound to help it heal.       •Hyperbaric oxygen therapy (HBO) may be used to get more oxygen to your tissues to help them heal. The pressurized oxygen is given as you sit in a pressure chamber.       •Surgery may be needed to clean the wound or remove infected or dead tissue.       How can I help my infection heal?   •Care for your wound as directed. Keep your wound clean and dry. You may need to cover your wound when you bathe so it does not get wet. Clean your wound as directed with soap and water or wound . Put on new, clean bandages as directed. Change your bandages when they get wet or dirty.      •Eat a variety of healthy foods. Examples include fruits, vegetables, whole-grain breads, low-fat dairy products, beans, lean meats, and fish. Healthy foods may help you heal faster. You may also need to take vitamins and minerals. Ask if you need to be on a special diet.      •Manage other health conditions. Follow your healthcare provider's directions to manage health conditions that can cause slow wound healing. Examples are high blood pressure and diabetes.      •Do not smoke. Nicotine and other chemicals in cigarettes and cigars can cause slow wound healing. Ask your healthcare provider for information if you currently smoke and need help to quit. E-cigarettes or smokeless tobacco still contain nicotine. Talk to your healthcare provider before you use these products.       When should I seek immediate care?   •You feel short of breath.       •Your heart is beating faster than usual.       •You are confused.       •Blood soaks through your bandages.       •Your wound comes apart or feels like it is ripping.       •You have severe pain.      •You see red streaks coming from the infected area.      When should I contact my healthcare provider?   •You have a fever or chills.       •You have more pain, redness, or swelling near your wound.      •Your symptoms do not improve.       •You have new drainage or a bad odor coming from the wound.      •You have questions or concerns about your condition or care.      CARE AGREEMENT:    You have the right to help plan your care. Learn about your health condition and how it may be treated. Discuss treatment options with your healthcare providers to decide what care you want to receive. You always have the right to refuse treatment.

## 2021-06-14 LAB
CULTURE RESULTS: NO GROWTH — SIGNIFICANT CHANGE UP
SPECIMEN SOURCE: SIGNIFICANT CHANGE UP

## 2022-10-17 NOTE — ED ADULT TRIAGE NOTE - MODE OF ARRIVAL
Walk in Drysol Counseling:  I discussed with the patient the risks of drysol/aluminum chloride including but not limited to skin rash, itching, irritation, burning. [employer]

## 2023-02-08 ENCOUNTER — NON-APPOINTMENT (OUTPATIENT)
Age: 58
End: 2023-02-08

## 2023-02-09 DIAGNOSIS — B37.9 CANDIDIASIS, UNSPECIFIED: ICD-10-CM

## 2023-02-13 ENCOUNTER — NON-APPOINTMENT (OUTPATIENT)
Age: 58
End: 2023-02-13

## 2023-02-13 RX ORDER — FLUCONAZOLE 150 MG/1
150 TABLET ORAL
Qty: 1 | Refills: 1 | Status: ACTIVE | COMMUNITY
Start: 2023-02-09

## 2023-02-16 ENCOUNTER — LABORATORY RESULT (OUTPATIENT)
Age: 58
End: 2023-02-16

## 2023-02-16 ENCOUNTER — APPOINTMENT (OUTPATIENT)
Dept: OBGYN | Facility: CLINIC | Age: 58
End: 2023-02-16
Payer: COMMERCIAL

## 2023-02-16 VITALS
DIASTOLIC BLOOD PRESSURE: 66 MMHG | SYSTOLIC BLOOD PRESSURE: 101 MMHG | HEART RATE: 76 BPM | HEIGHT: 67 IN | WEIGHT: 130 LBS | BODY MASS INDEX: 20.4 KG/M2

## 2023-02-16 DIAGNOSIS — Z78.0 ASYMPTOMATIC MENOPAUSAL STATE: ICD-10-CM

## 2023-02-16 DIAGNOSIS — R31.9 HEMATURIA, UNSPECIFIED: ICD-10-CM

## 2023-02-16 DIAGNOSIS — Z71.89 OTHER SPECIFIED COUNSELING: ICD-10-CM

## 2023-02-16 DIAGNOSIS — Z00.00 ENCOUNTER FOR GENERAL ADULT MEDICAL EXAMINATION W/OUT ABNORMAL FINDINGS: ICD-10-CM

## 2023-02-16 LAB
BILIRUB UR QL STRIP: NORMAL
CLARITY UR: CLEAR
COLLECTION METHOD: NORMAL
GLUCOSE UR-MCNC: NORMAL
HCG UR QL: 0.2 EU/DL
HGB UR QL STRIP.AUTO: NORMAL
KETONES UR-MCNC: NORMAL
LEUKOCYTE ESTERASE UR QL STRIP: NORMAL
NITRITE UR QL STRIP: NORMAL
PH UR STRIP: 5
PROT UR STRIP-MCNC: NORMAL
SP GR UR STRIP: 1.02

## 2023-02-16 PROCEDURE — 81003 URINALYSIS AUTO W/O SCOPE: CPT | Mod: QW

## 2023-02-16 PROCEDURE — 99214 OFFICE O/P EST MOD 30 MIN: CPT

## 2023-02-16 NOTE — PHYSICAL EXAM
[Labia Majora] : labia major [Labia Minora] : labia minora [Normal] : clitoris [Discharge] : a  ~M vaginal discharge was present [Moderate] : moderate [White] : white [Thick] : thick [No Bleeding] : there was no active vaginal bleeding [FreeTextEntry4] : vaginal ph 4

## 2023-02-16 NOTE — HISTORY OF PRESENT ILLNESS
[Burning] : burning [Discharge] : discharge [] : the vaginal discharge is described as [White] : white

## 2023-02-16 NOTE — CHIEF COMPLAINT
[Urgent Visit] : Urgent Visit [FreeTextEntry1] : Patient is a 56 yo female here today for vaginal irritation discharge and VMS symptoms. she is s/p LSH with MD weir a few years ago. She states she is having burning on urination as well.

## 2023-02-16 NOTE — REVIEW OF SYSTEMS
[Hot Flashes] : hot flashes [Nl] : Hematologic/Lymphatic [FreeTextEntry1] : vaginal irritation and discharge

## 2023-02-16 NOTE — DISCUSSION/SUMMARY
[FreeTextEntry1] : \par \par Will treat patient with estradiol 1mg for VMS. she is to call me if she has any side effects. \par i will also treat her for a vaginal yeast infection due to symptoms and discharge. WIll tx with Diflucan 100mg x 3 doses. \par Will send urine culture to r/o acute UTI.\par she is to follow up in 3 months for her annual visit and a estrogen pill check. \par All of her questions were answered she is agreeable with plan\par \par \par

## 2023-02-20 ENCOUNTER — NON-APPOINTMENT (OUTPATIENT)
Age: 58
End: 2023-02-20

## 2023-02-20 LAB
A VAGINAE DNA VAG QL NAA+PROBE: NORMAL
APPEARANCE: ABNORMAL
BACTERIA UR CULT: NORMAL
BILIRUBIN URINE: NEGATIVE
BLOOD URINE: ABNORMAL
BVAB2 DNA VAG QL NAA+PROBE: NORMAL
C KRUSEI DNA VAG QL NAA+PROBE: NEGATIVE
C TRACH RRNA SPEC QL NAA+PROBE: NEGATIVE
COLOR: NORMAL
GLUCOSE QUALITATIVE U: NEGATIVE
KETONES URINE: NEGATIVE
LEUKOCYTE ESTERASE URINE: NEGATIVE
MEGA1 DNA VAG QL NAA+PROBE: NORMAL
N GONORRHOEA RRNA SPEC QL NAA+PROBE: NEGATIVE
NITRITE URINE: NEGATIVE
PH URINE: 6
PROTEIN URINE: NEGATIVE
SPECIFIC GRAVITY URINE: >=1.03
T VAGINALIS RRNA SPEC QL NAA+PROBE: NEGATIVE
UROBILINOGEN URINE: NORMAL

## 2023-05-31 ENCOUNTER — LABORATORY RESULT (OUTPATIENT)
Age: 58
End: 2023-05-31

## 2023-05-31 ENCOUNTER — APPOINTMENT (OUTPATIENT)
Dept: OBGYN | Facility: CLINIC | Age: 58
End: 2023-05-31
Payer: COMMERCIAL

## 2023-05-31 ENCOUNTER — RESULT CHARGE (OUTPATIENT)
Age: 58
End: 2023-05-31

## 2023-05-31 VITALS
WEIGHT: 132 LBS | HEIGHT: 67 IN | BODY MASS INDEX: 20.72 KG/M2 | HEART RATE: 67 BPM | DIASTOLIC BLOOD PRESSURE: 79 MMHG | SYSTOLIC BLOOD PRESSURE: 121 MMHG

## 2023-05-31 DIAGNOSIS — N95.1 MENOPAUSAL AND FEMALE CLIMACTERIC STATES: ICD-10-CM

## 2023-05-31 DIAGNOSIS — N95.2 POSTMENOPAUSAL ATROPHIC VAGINITIS: ICD-10-CM

## 2023-05-31 LAB
BILIRUB UR QL STRIP: NORMAL
CLARITY UR: CLEAR
COLLECTION METHOD: NORMAL
DATE COLLECTED: NORMAL
GLUCOSE UR-MCNC: NORMAL
HCG UR QL: 0.2 EU/DL
HEMOCCULT SP1 STL QL: NEGATIVE
HEMOGLOBIN: 11.8
HGB UR QL STRIP.AUTO: NORMAL
KETONES UR-MCNC: NORMAL
LEUKOCYTE ESTERASE UR QL STRIP: NORMAL
NITRITE UR QL STRIP: NORMAL
PH UR STRIP: 7.5
PROT UR STRIP-MCNC: NORMAL
QUALITY CONTROL: YES
SP GR UR STRIP: 1.02

## 2023-05-31 PROCEDURE — 99396 PREV VISIT EST AGE 40-64: CPT

## 2023-05-31 PROCEDURE — 82270 OCCULT BLOOD FECES: CPT

## 2023-05-31 PROCEDURE — 81003 URINALYSIS AUTO W/O SCOPE: CPT | Mod: QW

## 2023-05-31 PROCEDURE — 85018 HEMOGLOBIN: CPT | Mod: QW

## 2023-05-31 RX ORDER — ESTRADIOL 2 MG/1
2 RING VAGINAL
Qty: 3 | Refills: 3 | Status: ACTIVE | COMMUNITY
Start: 2023-05-31 | End: 1900-01-01

## 2023-06-07 ENCOUNTER — OFFICE (OUTPATIENT)
Dept: URBAN - METROPOLITAN AREA CLINIC 102 | Facility: CLINIC | Age: 58
Setting detail: OPHTHALMOLOGY
End: 2023-06-07
Payer: MEDICAID

## 2023-06-07 DIAGNOSIS — H43.393: ICD-10-CM

## 2023-06-07 DIAGNOSIS — H01.002: ICD-10-CM

## 2023-06-07 DIAGNOSIS — H16.223: ICD-10-CM

## 2023-06-07 DIAGNOSIS — D31.42: ICD-10-CM

## 2023-06-07 DIAGNOSIS — D31.30: ICD-10-CM

## 2023-06-07 DIAGNOSIS — H01.004: ICD-10-CM

## 2023-06-07 DIAGNOSIS — H25.13: ICD-10-CM

## 2023-06-07 DIAGNOSIS — H52.13: ICD-10-CM

## 2023-06-07 DIAGNOSIS — H01.001: ICD-10-CM

## 2023-06-07 DIAGNOSIS — H01.005: ICD-10-CM

## 2023-06-07 LAB — CYTOLOGY CVX/VAG DOC THIN PREP: ABNORMAL

## 2023-06-07 PROCEDURE — 92014 COMPRE OPH EXAM EST PT 1/>: CPT | Performed by: OPHTHALMOLOGY

## 2023-06-07 PROCEDURE — 92250 FUNDUS PHOTOGRAPHY W/I&R: CPT | Performed by: OPHTHALMOLOGY

## 2023-06-07 PROCEDURE — 83861 MICROFLUID ANALY TEARS: CPT | Performed by: OPHTHALMOLOGY

## 2023-06-07 PROCEDURE — 92015 DETERMINE REFRACTIVE STATE: CPT | Performed by: OPHTHALMOLOGY

## 2023-06-07 ASSESSMENT — SPHEQUIV_DERIVED
OD_SPHEQUIV: -0.75
OD_SPHEQUIV: -0.875
OS_SPHEQUIV: -0.125
OD_SPHEQUIV: -0.875
OS_SPHEQUIV: -0.5

## 2023-06-07 ASSESSMENT — REFRACTION_AUTOREFRACTION
OS_CYLINDER: -0.75
OD_SPHERE: -0.75
OD_CYLINDER: -0.25
OS_AXIS: 101
OD_AXIS: 083
OS_SPHERE: +0.25

## 2023-06-07 ASSESSMENT — LID EXAM ASSESSMENTS
OD_BLEPHARITIS: RLL RUL 3+
OS_BLEPHARITIS: LLL LUL 3+

## 2023-06-07 ASSESSMENT — REFRACTION_MANIFEST
OS_VA1: 20/20
OD_VA1: 20/20
OD_CYLINDER: -0.50
OD_SPHERE: -0.50
OS_AXIS: 100
OS_SPHERE: -0.25
OS_VA1: 20/20
OS_CYLINDER: -0.50
OS_ADD: +2.50
OD_VA1: 20/20
OS_CYLINDER: -0.75
OD_SPHERE: -0.75
OU_VA: 20/20
OD_ADD: +2.50
OS_SPHERE: PLANO
OD_CYLINDER: -0.25
OS_AXIS: 95
OD_AXIS: 95
OD_AXIS: 083
OD_ADD: +2.50
OS_ADD: +2.50

## 2023-06-07 ASSESSMENT — REFRACTION_CURRENTRX
OD_AXIS: 095
OS_VPRISM_DIRECTION: BF
OS_SPHERE: -0.25
OS_OVR_VA: 20/
OD_CYLINDER: -0.50
OS_AXIS: 095
OD_ADD: +2.25
OD_OVR_VA: 20/
OS_CYLINDER: -0.50
OD_SPHERE: -1.00
OS_ADD: +2.25
OD_VPRISM_DIRECTION: BF

## 2023-06-07 ASSESSMENT — KERATOMETRY
OD_K2POWER_DIOPTERS: 45.00
OS_AXISANGLE_DEGREES: 044
METHOD_AUTO_MANUAL: AUTO
OD_K1POWER_DIOPTERS: 44.75
OS_K1POWER_DIOPTERS: 45.00
OD_AXISANGLE_DEGREES: 125
OS_K2POWER_DIOPTERS: 45.25

## 2023-06-07 ASSESSMENT — DECREASING TEAR LAKE - SEVERITY SCORE
OS_DEC_TEARLAKE: 2+
OD_DEC_TEARLAKE: 2+

## 2023-06-07 ASSESSMENT — AXIALLENGTH_DERIVED
OD_AL: 23.4289
OD_AL: 23.381
OS_AL: 23.0562
OS_AL: 23.1966
OD_AL: 23.4289

## 2023-06-07 ASSESSMENT — CONFRONTATIONAL VISUAL FIELD TEST (CVF)
OS_FINDINGS: FULL
OD_FINDINGS: FULL

## 2023-06-07 ASSESSMENT — TONOMETRY
OD_IOP_MMHG: 13
OS_IOP_MMHG: 10

## 2023-06-07 ASSESSMENT — VISUAL ACUITY
OS_BCVA: 20/50-1
OD_BCVA: 20/30

## 2023-06-07 NOTE — PHYSICAL EXAM
[Chaperone Present] : A chaperone was present in the examining room during all aspects of the physical examination [Appropriately responsive] : appropriately responsive [Alert] : alert [No Acute Distress] : no acute distress [No Lymphadenopathy] : no lymphadenopathy [Soft] : soft [Non-tender] : non-tender [Non-distended] : non-distended [No HSM] : No HSM [No Lesions] : no lesions [No Mass] : no mass [Oriented x3] : oriented x3 [Examination Of The Breasts] : a normal appearance [No Discharge] : no discharge [No Masses] : no breast masses were palpable [Labia Majora] : normal [Labia Minora] : normal [Normal] : normal [Absent] : absent [Uterine Adnexae] : normal [Normal rectal exam] : was normal [FreeTextEntry1] : Yvonne ZAMORA-MACARIO chaperoned during entire physical exam, [Occult Blood Positive] : was negative for occult blood analysis

## 2023-06-07 NOTE — PLAN
[FreeTextEntry1] : \par \par Patient to follow up in 1 year for annual GYN exam\par Mammogram and bilateral breast US due:  now rx given \par Colonoscopy due: per [place] \par Bone density due:  8/24 \par \par Pap ordered\par Hemoccult ordered \par All questions answered, patient agreeable with plan.\par \par discussed with patient she should start taking estradiol at night. if her hot flashes still occur we will add 0.5mg of estradiol during the day time. \par discussed vaginal ring vs vaginal tablet for vaginal  atrophy and dryness. \par \par VEE ZAMORA-C am scribing for the presence of Dr. Keene the following sections HISTORY OF PRESENT ILLNESS, PAST MEDICAL/FAMILY/SOCIAL HISTORY; REVIEW OF SYSTEMS; VITAL SIGNS; PHYSICAL EXAM; DISPOSITION. \par \par \par I personally performed the services described in the documentation, reviewed the documentation recorded by the scribe in my presence and it accurately and completely records my words and actions.\par

## 2023-06-07 NOTE — HISTORY OF PRESENT ILLNESS
[Hot Flashes] : hot flashes [Difficulty with Captree] : difficulty with intercourse [TextBox_4] : patient is a 56 yo female here today for annual visit. She is s/p [facility]. She saw Yvonne ZAIDI in feb for +VMS and a yeast infection. she was placed on estradiol 1mg, she states her hot flashes are still present. she takes the estradiol midday. \par she complains of vaginal dryness

## 2023-06-13 ENCOUNTER — NON-APPOINTMENT (OUTPATIENT)
Age: 58
End: 2023-06-13

## 2023-06-16 ENCOUNTER — NON-APPOINTMENT (OUTPATIENT)
Age: 58
End: 2023-06-16

## 2023-06-29 ENCOUNTER — NON-APPOINTMENT (OUTPATIENT)
Age: 58
End: 2023-06-29

## 2023-06-30 ENCOUNTER — APPOINTMENT (OUTPATIENT)
Dept: SURGERY | Facility: CLINIC | Age: 58
End: 2023-06-30
Payer: COMMERCIAL

## 2023-06-30 DIAGNOSIS — R92.2 INCONCLUSIVE MAMMOGRAM: ICD-10-CM

## 2023-06-30 PROCEDURE — 99204 OFFICE O/P NEW MOD 45 MIN: CPT

## 2023-06-30 NOTE — HISTORY OF PRESENT ILLNESS
[de-identified] : Bilat breast mass - sub centimeter. No sx. No personal history of breast Ca. + history of melanoma. Interested in bx.

## 2023-06-30 NOTE — CONSULT LETTER
[Dear  ___] : Dear  [unfilled], [Consult Letter:] : I had the pleasure of evaluating your patient, [unfilled]. [Please see my note below.] : Please see my note below. [Consult Closing:] : Thank you very much for allowing me to participate in the care of this patient.  If you have any questions, please do not hesitate to contact me. [Sincerely,] : Sincerely, [FreeTextEntry3] : Wm Willam JACOBSEN [DrObed  ___] : Dr. GUNDERSON

## 2023-06-30 NOTE — REVIEW OF SYSTEMS
[Fever] : no fever [Chills] : no chills [Eye Pain] : no eye pain [Earache] : no earache [Chest Pain] : no chest pain [Shortness Of Breath] : no shortness of breath [Abdominal Pain] : no abdominal pain [Breast Pain] : breast pain [Convulsions] : no convulsions [Easy Bleeding] : no tendency for easy bleeding [Easy Bruising] : no tendency for easy bruising [de-identified] : bilat

## 2023-06-30 NOTE — PHYSICAL EXAM
[JVD] : no jugular venous distention  [Normal Breath Sounds] : Normal breath sounds [Normal Heart Sounds] : normal heart sounds [Abdomen Tenderness] : ~T ~M No abdominal tenderness [No HSM] : no hepatosplenomegaly [No Rash or Lesion] : No rash or lesion [Alert] : alert [Oriented to Person] : oriented to person [Oriented to Place] : oriented to place [Oriented to Time] : oriented to time [Calm] : calm [de-identified] : NAD [de-identified] : NC/AT PER [de-identified] : no masses, no LN, no nipple d/c. [de-identified] : soft, NL BS [de-identified] : no CVA tenderness [de-identified] : moves all 4 extr 5/5

## 2023-07-05 ENCOUNTER — APPOINTMENT (OUTPATIENT)
Dept: OBGYN | Facility: CLINIC | Age: 58
End: 2023-07-05
Payer: COMMERCIAL

## 2023-07-05 DIAGNOSIS — R10.2 PELVIC AND PERINEAL PAIN: ICD-10-CM

## 2023-07-05 DIAGNOSIS — R87.619 UNSPECIFIED ABNORMAL CYTOLOGICAL FINDINGS IN SPECIMENS FROM CERVIX UTERI: ICD-10-CM

## 2023-07-05 PROCEDURE — 57456 ENDOCERV CURETTAGE W/SCOPE: CPT

## 2023-07-06 ENCOUNTER — NON-APPOINTMENT (OUTPATIENT)
Age: 58
End: 2023-07-06

## 2023-07-07 NOTE — ASSESSMENT
[FreeTextEntry1] : patient is a 58 yo female here today for colposcopy. Pap smear ASCUS +HPV. ONLY ECC PERFORMED TODAY. VERY DIFFICULT PROCEDURE SECONDARY TO SCAR TISSUE \par \par SHE HAS BEEN EXPERIENCING PELVIC PAIN, RLQ PELVIC PAIN SHE HAD A SONOGRAM DONE AND RESULTS ARE NOT BACK YET. \par \par \par \par

## 2023-07-07 NOTE — PROCEDURE
[Colposcopy] : Colposcopy  [Risks] : risks [Benefits] : benefits [Infection] : infection [Bleeding] : bleeding [ASCUS] : ASCUS [No Premedication] : no premedication [ECC Performed] : ECC performed [Hemostasis Obtained] : Hemostasis obtained [Tolerated Well] : the patient tolerated the procedure well [de-identified] : +HPV

## 2023-07-07 NOTE — PLAN
[FreeTextEntry1] : \par \par Will follow up  ECC results. DIFFICULT PROCEDURE SECONDARY TO SCAR TISSUE. \par CONSIDERING LEEP PROCEDURE, AND AT SOME POINT POSSIBLE TRACHELECTOMY. \par will follow up on pelvic sonogram results\par she is to call me if she has any heavy vaginal bleeding or pelvic pain that does not resolve with NSAIDS.\par all of her questions were answered she is agreeable with plan \par \par \par \par I Yvonne Balderrama FNP-C am scribing for the presence of Dr. Keene the following sections HISTORY OF PRESENT ILLNESS, PAST MEDICAL/FAMILY/SOCIAL HISTORY; REVIEW OF SYSTEMS; VITAL SIGNS; PHYSICAL EXAM; DISPOSITION. \par \par \par I personally performed the services described in the documentation, reviewed the documentation recorded by the scribe in my presence and it accurately and completely records my words and actions.\par

## 2023-07-11 ENCOUNTER — NON-APPOINTMENT (OUTPATIENT)
Age: 58
End: 2023-07-11

## 2023-07-11 LAB — CORE LAB BIOPSY: NORMAL

## 2023-07-17 ENCOUNTER — APPOINTMENT (OUTPATIENT)
Dept: OBGYN | Facility: CLINIC | Age: 58
End: 2023-07-17
Payer: COMMERCIAL

## 2023-07-17 PROCEDURE — 99214 OFFICE O/P EST MOD 30 MIN: CPT

## 2023-07-24 NOTE — PLAN
[FreeTextEntry1] : \par No vaginal exam performed today \par Discussed pre op and post op instructions in detail.\par Vaginal restrictions post op for 4 weeks \par she is to obtain medical clearance prior \par she is having a breast biopsy with Dr. Quarles on Friday\par she is to schedule a final decision appt with me\par Discussed this will likely be diagnostic and therapeutic. \par \par \par I Mckayla WALSH am scribing for the presence of Dr. Keene the following sections HISTORY OF PRESENT ILLNESS, PAST MEDICAL/FAMILY/SOCIAL HISTORY; REVIEW OF SYSTEMS; VITAL SIGNS; PHYSICAL EXAM; DISPOSITION. \par \par \par I personally performed the services described in the documentation, reviewed the documentation recorded by the scribe in my presence and it accurately and completely records my words and actions.\par \par \par

## 2023-08-02 ENCOUNTER — OUTPATIENT (OUTPATIENT)
Dept: OUTPATIENT SERVICES | Facility: HOSPITAL | Age: 58
LOS: 1 days | End: 2023-08-02
Payer: COMMERCIAL

## 2023-08-02 VITALS
HEART RATE: 59 BPM | TEMPERATURE: 98 F | WEIGHT: 136.91 LBS | RESPIRATION RATE: 17 BRPM | OXYGEN SATURATION: 100 % | SYSTOLIC BLOOD PRESSURE: 115 MMHG | DIASTOLIC BLOOD PRESSURE: 68 MMHG | HEIGHT: 67 IN

## 2023-08-02 DIAGNOSIS — Z01.818 ENCOUNTER FOR OTHER PREPROCEDURAL EXAMINATION: ICD-10-CM

## 2023-08-02 DIAGNOSIS — Z98.890 OTHER SPECIFIED POSTPROCEDURAL STATES: Chronic | ICD-10-CM

## 2023-08-02 DIAGNOSIS — Z90.710 ACQUIRED ABSENCE OF BOTH CERVIX AND UTERUS: Chronic | ICD-10-CM

## 2023-08-02 LAB
ANION GAP SERPL CALC-SCNC: 4 MMOL/L — LOW (ref 5–17)
APPEARANCE UR: CLEAR — SIGNIFICANT CHANGE UP
BACTERIA # UR AUTO: ABNORMAL
BASOPHILS # BLD AUTO: 0.03 K/UL — SIGNIFICANT CHANGE UP (ref 0–0.2)
BASOPHILS NFR BLD AUTO: 0.5 % — SIGNIFICANT CHANGE UP (ref 0–2)
BILIRUB UR-MCNC: NEGATIVE — SIGNIFICANT CHANGE UP
BLD GP AB SCN SERPL QL: SIGNIFICANT CHANGE UP
BUN SERPL-MCNC: 16 MG/DL — SIGNIFICANT CHANGE UP (ref 7–23)
CALCIUM SERPL-MCNC: 9 MG/DL — SIGNIFICANT CHANGE UP (ref 8.5–10.1)
CHLORIDE SERPL-SCNC: 110 MMOL/L — HIGH (ref 96–108)
CO2 SERPL-SCNC: 29 MMOL/L — SIGNIFICANT CHANGE UP (ref 22–31)
COLOR SPEC: YELLOW — SIGNIFICANT CHANGE UP
COMMENT - URINE: SIGNIFICANT CHANGE UP
CREAT SERPL-MCNC: 0.83 MG/DL — SIGNIFICANT CHANGE UP (ref 0.5–1.3)
DIFF PNL FLD: ABNORMAL
EGFR: 83 ML/MIN/1.73M2 — SIGNIFICANT CHANGE UP
EOSINOPHIL # BLD AUTO: 0.14 K/UL — SIGNIFICANT CHANGE UP (ref 0–0.5)
EOSINOPHIL NFR BLD AUTO: 2.4 % — SIGNIFICANT CHANGE UP (ref 0–6)
EPI CELLS # UR: SIGNIFICANT CHANGE UP
GLUCOSE SERPL-MCNC: 97 MG/DL — SIGNIFICANT CHANGE UP (ref 70–99)
GLUCOSE UR QL: NEGATIVE — SIGNIFICANT CHANGE UP
HCT VFR BLD CALC: 35.5 % — SIGNIFICANT CHANGE UP (ref 34.5–45)
HGB BLD-MCNC: 11.8 G/DL — SIGNIFICANT CHANGE UP (ref 11.5–15.5)
IMM GRANULOCYTES NFR BLD AUTO: 0.2 % — SIGNIFICANT CHANGE UP (ref 0–0.9)
KETONES UR-MCNC: NEGATIVE — SIGNIFICANT CHANGE UP
LEUKOCYTE ESTERASE UR-ACNC: NEGATIVE — SIGNIFICANT CHANGE UP
LYMPHOCYTES # BLD AUTO: 1.94 K/UL — SIGNIFICANT CHANGE UP (ref 1–3.3)
LYMPHOCYTES # BLD AUTO: 33.8 % — SIGNIFICANT CHANGE UP (ref 13–44)
MCHC RBC-ENTMCNC: 30.8 PG — SIGNIFICANT CHANGE UP (ref 27–34)
MCHC RBC-ENTMCNC: 33.2 GM/DL — SIGNIFICANT CHANGE UP (ref 32–36)
MCV RBC AUTO: 92.7 FL — SIGNIFICANT CHANGE UP (ref 80–100)
MONOCYTES # BLD AUTO: 0.51 K/UL — SIGNIFICANT CHANGE UP (ref 0–0.9)
MONOCYTES NFR BLD AUTO: 8.9 % — SIGNIFICANT CHANGE UP (ref 2–14)
NEUTROPHILS # BLD AUTO: 3.11 K/UL — SIGNIFICANT CHANGE UP (ref 1.8–7.4)
NEUTROPHILS NFR BLD AUTO: 54.2 % — SIGNIFICANT CHANGE UP (ref 43–77)
NITRITE UR-MCNC: NEGATIVE — SIGNIFICANT CHANGE UP
PH UR: 5 — SIGNIFICANT CHANGE UP (ref 5–8)
PLATELET # BLD AUTO: 272 K/UL — SIGNIFICANT CHANGE UP (ref 150–400)
POTASSIUM SERPL-MCNC: 3.8 MMOL/L — SIGNIFICANT CHANGE UP (ref 3.5–5.3)
POTASSIUM SERPL-SCNC: 3.8 MMOL/L — SIGNIFICANT CHANGE UP (ref 3.5–5.3)
PROT UR-MCNC: NEGATIVE — SIGNIFICANT CHANGE UP
RBC # BLD: 3.83 M/UL — SIGNIFICANT CHANGE UP (ref 3.8–5.2)
RBC # FLD: 13.2 % — SIGNIFICANT CHANGE UP (ref 10.3–14.5)
RBC CASTS # UR COMP ASSIST: SIGNIFICANT CHANGE UP /HPF (ref 0–4)
SODIUM SERPL-SCNC: 143 MMOL/L — SIGNIFICANT CHANGE UP (ref 135–145)
SP GR SPEC: 1.02 — SIGNIFICANT CHANGE UP (ref 1.01–1.02)
UROBILINOGEN FLD QL: NEGATIVE — SIGNIFICANT CHANGE UP
WBC # BLD: 5.74 K/UL — SIGNIFICANT CHANGE UP (ref 3.8–10.5)
WBC # FLD AUTO: 5.74 K/UL — SIGNIFICANT CHANGE UP (ref 3.8–10.5)
WBC UR QL: NEGATIVE /HPF — SIGNIFICANT CHANGE UP (ref 0–5)

## 2023-08-02 PROCEDURE — 93010 ELECTROCARDIOGRAM REPORT: CPT

## 2023-08-02 PROCEDURE — 93005 ELECTROCARDIOGRAM TRACING: CPT

## 2023-08-02 PROCEDURE — 83036 HEMOGLOBIN GLYCOSYLATED A1C: CPT

## 2023-08-02 PROCEDURE — 81001 URINALYSIS AUTO W/SCOPE: CPT

## 2023-08-02 PROCEDURE — 85025 COMPLETE CBC W/AUTO DIFF WBC: CPT

## 2023-08-02 PROCEDURE — 86901 BLOOD TYPING SEROLOGIC RH(D): CPT

## 2023-08-02 PROCEDURE — 36415 COLL VENOUS BLD VENIPUNCTURE: CPT

## 2023-08-02 PROCEDURE — 86900 BLOOD TYPING SEROLOGIC ABO: CPT

## 2023-08-02 PROCEDURE — 99214 OFFICE O/P EST MOD 30 MIN: CPT | Mod: 25

## 2023-08-02 PROCEDURE — 80048 BASIC METABOLIC PNL TOTAL CA: CPT

## 2023-08-02 PROCEDURE — 86850 RBC ANTIBODY SCREEN: CPT

## 2023-08-02 NOTE — H&P PST ADULT - HISTORY OF PRESENT ILLNESS
57 year old female presents with pMHx HPV. She explains how shes had abnormal pap smears for two years. Colposcopy performed in office but was unable to obtain proper sampling. She is now scheduled to have loop electrosurgical excision procedure. Patient without complaints at this time.

## 2023-08-02 NOTE — H&P PST ADULT - NSICDXFAMILYHX_GEN_ALL_CORE_FT
FAMILY HISTORY:  Father  Still living? Yes, Estimated age:   FH: Parkinson's disease, Age at diagnosis: Age Unknown    Mother  Still living? No  FH: hypertension, Age at diagnosis: Age Unknown  FH: kidney failure, Age at diagnosis: Age Unknown

## 2023-08-02 NOTE — H&P PST ADULT - ASSESSMENT
57 year old female who presents for loop electrosurgical excision procedure.      Plan:  1. PST instructions given ; NPO status/ instructions to be given by ASU   2. Pt instructed to take following meds on day of surgery: levothyroxine   3. Pt instructed to take routine evening medications unless indicated   4. Stop NSAIDS ( Aspirin, Aleve, Motrin, Mobic, Diclofenac), herbal supplements, MVI, Vitamins, fish oil 7 days prior to surgery unless directed by surgeon or cardiologist;   5. Medical Optimization with Dr. Bassett  6. EZ wash instructions given.   7. Labs and EKG as per surgeon request

## 2023-08-02 NOTE — H&P PST ADULT - BP NONINVASIVE DIASTOLIC (MM HG)
Pathology results reviewed     EGD with Bx shows     Small bowel bx : Normal small bowel biopsy   Gastric antral Bx : inflammation seen on stomach biopsy but there is no evidence of bacterial infection (H pylori) or pre cancerous changes (intestinal metaplasia).   Distal Esophageal/GE junction Bx :There is inflammation at this site which is indicative of acid reflux disease. However, there are no pre cancerous changes (Nicole esophagus) seen.   Mid Esophageal Bx : Biopsies are not indicative of eosinophilic esophagitis     Recommendation   - Continue PPI - Prilosec 40 mg po QD       Cuong Alicia MD  
70

## 2023-08-02 NOTE — H&P PST ADULT - NSICDXPASTMEDICALHX_GEN_ALL_CORE_FT
PAST MEDICAL HISTORY:  Breast mass     HPV in female     Hypothyroidism     Melanoma removed    PONV (postoperative nausea and vomiting)     Prediabetes

## 2023-08-03 DIAGNOSIS — Z01.818 ENCOUNTER FOR OTHER PREPROCEDURAL EXAMINATION: ICD-10-CM

## 2023-08-03 LAB
A1C WITH ESTIMATED AVERAGE GLUCOSE RESULT: 5.2 % — SIGNIFICANT CHANGE UP (ref 4–5.6)
ESTIMATED AVERAGE GLUCOSE: 103 MG/DL — SIGNIFICANT CHANGE UP (ref 68–114)

## 2023-08-07 ENCOUNTER — RESULT CHARGE (OUTPATIENT)
Age: 58
End: 2023-08-07

## 2023-08-07 ENCOUNTER — APPOINTMENT (OUTPATIENT)
Dept: OBGYN | Facility: CLINIC | Age: 58
End: 2023-08-07
Payer: COMMERCIAL

## 2023-08-07 VITALS — DIASTOLIC BLOOD PRESSURE: 80 MMHG | TEMPERATURE: 97.8 F | HEART RATE: 81 BPM | SYSTOLIC BLOOD PRESSURE: 123 MMHG

## 2023-08-07 LAB — HEMOGLOBIN: 10.1

## 2023-08-07 PROCEDURE — 99214 OFFICE O/P EST MOD 30 MIN: CPT | Mod: 57

## 2023-08-07 PROCEDURE — 85018 HEMOGLOBIN: CPT | Mod: QW

## 2023-08-07 NOTE — PHYSICAL EXAM
[Appropriately responsive] : appropriately responsive [Alert] : alert [No Acute Distress] : no acute distress [No Lymphadenopathy] : no lymphadenopathy [Regular Rate Rhythm] : regular rate rhythm [No Murmurs] : no murmurs [Clear to Auscultation B/L] : clear to auscultation bilaterally [Soft] : soft [Non-tender] : non-tender [Non-distended] : non-distended [No HSM] : No HSM [No Lesions] : no lesions [No Mass] : no mass [Oriented x3] : oriented x3 [Labia Majora] : normal [Labia Minora] : normal [Normal] : normal [Absent] : absent [Uterine Adnexae] : normal

## 2023-08-08 ENCOUNTER — TRANSCRIPTION ENCOUNTER (OUTPATIENT)
Age: 58
End: 2023-08-08

## 2023-08-08 ENCOUNTER — APPOINTMENT (OUTPATIENT)
Dept: OBGYN | Facility: HOSPITAL | Age: 58
End: 2023-08-08

## 2023-08-08 ENCOUNTER — OUTPATIENT (OUTPATIENT)
Dept: INPATIENT UNIT | Facility: HOSPITAL | Age: 58
LOS: 1 days | Discharge: HOME | End: 2023-08-08
Payer: COMMERCIAL

## 2023-08-08 ENCOUNTER — RESULT REVIEW (OUTPATIENT)
Age: 58
End: 2023-08-08

## 2023-08-08 VITALS
HEIGHT: 67 IN | RESPIRATION RATE: 15 BRPM | TEMPERATURE: 98 F | HEART RATE: 60 BPM | OXYGEN SATURATION: 100 % | DIASTOLIC BLOOD PRESSURE: 74 MMHG | SYSTOLIC BLOOD PRESSURE: 109 MMHG | WEIGHT: 136.91 LBS

## 2023-08-08 VITALS
TEMPERATURE: 97 F | RESPIRATION RATE: 16 BRPM | OXYGEN SATURATION: 100 % | HEART RATE: 55 BPM | SYSTOLIC BLOOD PRESSURE: 122 MMHG | DIASTOLIC BLOOD PRESSURE: 83 MMHG

## 2023-08-08 DIAGNOSIS — R87.619 UNSPECIFIED ABNORMAL CYTOLOGICAL FINDINGS IN SPECIMENS FROM CERVIX UTERI: ICD-10-CM

## 2023-08-08 DIAGNOSIS — Z90.710 ACQUIRED ABSENCE OF BOTH CERVIX AND UTERUS: Chronic | ICD-10-CM

## 2023-08-08 DIAGNOSIS — Z98.890 OTHER SPECIFIED POSTPROCEDURAL STATES: Chronic | ICD-10-CM

## 2023-08-08 PROCEDURE — 88342 IMHCHEM/IMCYTCHM 1ST ANTB: CPT

## 2023-08-08 PROCEDURE — C9399: CPT

## 2023-08-08 PROCEDURE — 88307 TISSUE EXAM BY PATHOLOGIST: CPT | Mod: 26

## 2023-08-08 PROCEDURE — 88305 TISSUE EXAM BY PATHOLOGIST: CPT | Mod: 26

## 2023-08-08 PROCEDURE — 57461 CONZ OF CERVIX W/SCOPE LEEP: CPT

## 2023-08-08 PROCEDURE — 88307 TISSUE EXAM BY PATHOLOGIST: CPT

## 2023-08-08 PROCEDURE — 88342 IMHCHEM/IMCYTCHM 1ST ANTB: CPT | Mod: 26

## 2023-08-08 PROCEDURE — 88305 TISSUE EXAM BY PATHOLOGIST: CPT

## 2023-08-08 RX ORDER — CHOLECALCIFEROL (VITAMIN D3) 125 MCG
1 CAPSULE ORAL
Refills: 0 | DISCHARGE

## 2023-08-08 RX ORDER — OMEPRAZOLE 10 MG/1
1 CAPSULE, DELAYED RELEASE ORAL
Refills: 0 | DISCHARGE

## 2023-08-08 RX ORDER — LEVOTHYROXINE SODIUM 125 MCG
1 TABLET ORAL
Refills: 0 | DISCHARGE

## 2023-08-08 RX ORDER — APREPITANT 80 MG/1
40 CAPSULE ORAL ONCE
Refills: 0 | Status: COMPLETED | OUTPATIENT
Start: 2023-08-08 | End: 2023-08-08

## 2023-08-08 RX ORDER — RANITIDINE HYDROCHLORIDE 150 MG/1
1 TABLET, FILM COATED ORAL
Qty: 0 | Refills: 0 | DISCHARGE

## 2023-08-08 RX ORDER — METFORMIN HYDROCHLORIDE 850 MG/1
1 TABLET ORAL
Refills: 0 | DISCHARGE

## 2023-08-08 RX ADMIN — APREPITANT 40 MILLIGRAM(S): 80 CAPSULE ORAL at 12:31

## 2023-08-08 NOTE — HISTORY OF PRESENT ILLNESS
[FreeTextEntry1] : Patient here for final decision for surgery. plan: LEEP  colposcopy was not adequate.

## 2023-08-08 NOTE — ASU DISCHARGE PLAN (ADULT/PEDIATRIC) - CARE PROVIDER_API CALL
Erasto Keene  Obstetrics and Gynecology  [address] [place] [id number]  Phone: [phone]  Fax: [phone]  Follow Up Time: 1 month

## 2023-08-08 NOTE — BRIEF OPERATIVE NOTE - NSICDXBRIEFPREOP_GEN_ALL_CORE_FT
PRE-OP DIAGNOSIS:  Abnormal Pap smear of cervix 08-Aug-2023 12:49:15 inadequate colposcopy Erasto Keene

## 2023-08-08 NOTE — BRIEF OPERATIVE NOTE - NSICDXBRIEFPOSTOP_GEN_ALL_CORE_FT
POST-OP DIAGNOSIS:  Abnormal Pap smear of cervix 08-Aug-2023 12:50:18 inadequate colposcopy Erasto Keene

## 2023-08-08 NOTE — ASU DISCHARGE PLAN (ADULT/PEDIATRIC) - NS MD DC FALL RISK RISK
For information on Fall & Injury Prevention, visit: [email or web address] OR  [email or web address] OR  https://www.cdc.gov/steadi/patient.html

## 2023-08-08 NOTE — BRIEF OPERATIVE NOTE - NSICDXBRIEFPROCEDURE_GEN_ALL_CORE_FT
PROCEDURES:  Exam under anesthesia, gynecologic 08-Aug-2023 12:48:42  Erasto Keene  Colposcopy, cervix 08-Aug-2023 12:48:48  Erasto Keene  LEEP 08-Aug-2023 12:48:52  Erasto Keene  Endocervical curettage 08-Aug-2023 12:49:00  Erasto Keene

## 2023-08-08 NOTE — PLAN
[FreeTextEntry1] :  Discussed pre op and post op instructions in detail. Vaginal restrictions post op only all of patients questions regarding procedure were answered. consent signed by MD, patient and witness. she is to f/u with me 2 weeks post operatively. she is agreeable with plan.  I Mckayla WALSH am scribing for the presence of Dr. Keene the following sections HISTORY OF PRESENT ILLNESS, PAST MEDICAL/FAMILY/SOCIAL HISTORY; REVIEW OF SYSTEMS; VITAL SIGNS; PHYSICAL EXAM; DISPOSITION.    I personally performed the services described in the documentation, reviewed the documentation recorded by the scribe in my presence and it accurately and completely records my words and actions.

## 2023-08-14 LAB — SURGICAL PATHOLOGY STUDY: SIGNIFICANT CHANGE UP

## 2023-08-15 PROBLEM — B97.7 PAPILLOMAVIRUS AS THE CAUSE OF DISEASES CLASSIFIED ELSEWHERE: Chronic | Status: ACTIVE | Noted: 2023-08-02

## 2023-08-15 PROBLEM — N63.0 UNSPECIFIED LUMP IN UNSPECIFIED BREAST: Chronic | Status: ACTIVE | Noted: 2023-08-02

## 2023-08-15 PROBLEM — R73.03 PREDIABETES: Chronic | Status: ACTIVE | Noted: 2023-08-02

## 2023-08-15 PROBLEM — E03.9 HYPOTHYROIDISM, UNSPECIFIED: Chronic | Status: ACTIVE | Noted: 2023-08-02

## 2023-08-15 PROBLEM — R11.2 NAUSEA WITH VOMITING, UNSPECIFIED: Chronic | Status: ACTIVE | Noted: 2023-08-02

## 2023-08-16 DIAGNOSIS — N72 INFLAMMATORY DISEASE OF CERVIX UTERI: ICD-10-CM

## 2023-08-16 DIAGNOSIS — Z87.891 PERSONAL HISTORY OF NICOTINE DEPENDENCE: ICD-10-CM

## 2023-08-16 DIAGNOSIS — E03.9 HYPOTHYROIDISM, UNSPECIFIED: ICD-10-CM

## 2023-08-16 DIAGNOSIS — Z88.5 ALLERGY STATUS TO NARCOTIC AGENT: ICD-10-CM

## 2023-08-16 DIAGNOSIS — Z90.710 ACQUIRED ABSENCE OF BOTH CERVIX AND UTERUS: ICD-10-CM

## 2023-08-16 DIAGNOSIS — R87.619 UNSPECIFIED ABNORMAL CYTOLOGICAL FINDINGS IN SPECIMENS FROM CERVIX UTERI: ICD-10-CM

## 2023-08-16 DIAGNOSIS — K21.9 GASTRO-ESOPHAGEAL REFLUX DISEASE WITHOUT ESOPHAGITIS: ICD-10-CM

## 2023-09-05 ENCOUNTER — APPOINTMENT (OUTPATIENT)
Dept: OBGYN | Facility: CLINIC | Age: 58
End: 2023-09-05
Payer: COMMERCIAL

## 2023-09-05 ENCOUNTER — RESULT CHARGE (OUTPATIENT)
Age: 58
End: 2023-09-05

## 2023-09-05 VITALS — SYSTOLIC BLOOD PRESSURE: 120 MMHG | DIASTOLIC BLOOD PRESSURE: 80 MMHG | HEART RATE: 75 BPM

## 2023-09-05 LAB — HEMOGLOBIN: 12.9

## 2023-09-05 PROCEDURE — 99213 OFFICE O/P EST LOW 20 MIN: CPT

## 2023-09-05 PROCEDURE — 85018 HEMOGLOBIN: CPT | Mod: QW

## 2023-09-05 RX ADMIN — ESTRADIOL 0 MCG: 10 INSERT VAGINAL at 00:00

## 2023-09-05 NOTE — PLAN
[FreeTextEntry1] : 57 yr old Presents for post op visit s/p LEEP 8/23 due for PAP in 4mnths  colposcopy was not adequate. resume normal activities Vagifem rx sent due to vaginal atrophy Rx given for diagnostic sonogram b/l Dense breasts, hx of BIRADS 3 sonogram 7/23  Christos GLASSP-wilber Chaperone  I Christos Pereira  am scribing for the presence of Dr. Keene the following sections HISTORY OF PRESENT ILLNESS, PAST MEDICAL/FAMILY/SOCIAL HISTORY; REVIEW OF SYSTEMS; VITAL SIGNS; PHYSICAL EXAM; DISPOSITION.    I personally performed the services described in the documentation, reviewed the documentation recorded by the scribe in my presence and it accurately and completely records my words and actions.

## 2023-09-26 RX ORDER — ESTRADIOL 10 UG/1
10 TABLET, FILM COATED VAGINAL
Qty: 180 | Refills: 2 | Status: ACTIVE | COMMUNITY
Start: 2023-09-12

## 2023-11-01 ENCOUNTER — EMERGENCY (EMERGENCY)
Facility: HOSPITAL | Age: 58
LOS: 0 days | Discharge: HOME | End: 2023-11-01
Attending: STUDENT IN AN ORGANIZED HEALTH CARE EDUCATION/TRAINING PROGRAM
Payer: COMMERCIAL

## 2023-11-01 VITALS — HEIGHT: 67 IN | WEIGHT: 139.99 LBS

## 2023-11-01 VITALS
RESPIRATION RATE: 18 BRPM | OXYGEN SATURATION: 98 % | DIASTOLIC BLOOD PRESSURE: 69 MMHG | SYSTOLIC BLOOD PRESSURE: 116 MMHG | TEMPERATURE: 99 F | HEART RATE: 66 BPM

## 2023-11-01 DIAGNOSIS — R73.03 PREDIABETES: ICD-10-CM

## 2023-11-01 DIAGNOSIS — N13.30 UNSPECIFIED HYDRONEPHROSIS: ICD-10-CM

## 2023-11-01 DIAGNOSIS — Z90.710 ACQUIRED ABSENCE OF BOTH CERVIX AND UTERUS: Chronic | ICD-10-CM

## 2023-11-01 DIAGNOSIS — R68.83 CHILLS (WITHOUT FEVER): ICD-10-CM

## 2023-11-01 DIAGNOSIS — R51.9 HEADACHE, UNSPECIFIED: ICD-10-CM

## 2023-11-01 DIAGNOSIS — B97.7 PAPILLOMAVIRUS AS THE CAUSE OF DISEASES CLASSIFIED ELSEWHERE: ICD-10-CM

## 2023-11-01 DIAGNOSIS — Z98.890 OTHER SPECIFIED POSTPROCEDURAL STATES: Chronic | ICD-10-CM

## 2023-11-01 DIAGNOSIS — R00.0 TACHYCARDIA, UNSPECIFIED: ICD-10-CM

## 2023-11-01 DIAGNOSIS — R12 HEARTBURN: ICD-10-CM

## 2023-11-01 DIAGNOSIS — Z79.84 LONG TERM (CURRENT) USE OF ORAL HYPOGLYCEMIC DRUGS: ICD-10-CM

## 2023-11-01 DIAGNOSIS — Z90.710 ACQUIRED ABSENCE OF BOTH CERVIX AND UTERUS: ICD-10-CM

## 2023-11-01 DIAGNOSIS — E03.9 HYPOTHYROIDISM, UNSPECIFIED: ICD-10-CM

## 2023-11-01 DIAGNOSIS — Z88.5 ALLERGY STATUS TO NARCOTIC AGENT: ICD-10-CM

## 2023-11-01 LAB
ALBUMIN SERPL ELPH-MCNC: 4.1 G/DL — SIGNIFICANT CHANGE UP (ref 3.3–5)
ALBUMIN SERPL ELPH-MCNC: 4.1 G/DL — SIGNIFICANT CHANGE UP (ref 3.3–5)
ALP SERPL-CCNC: 62 U/L — SIGNIFICANT CHANGE UP (ref 40–120)
ALP SERPL-CCNC: 62 U/L — SIGNIFICANT CHANGE UP (ref 40–120)
ALT FLD-CCNC: 47 U/L — SIGNIFICANT CHANGE UP (ref 12–78)
ALT FLD-CCNC: 47 U/L — SIGNIFICANT CHANGE UP (ref 12–78)
ANION GAP SERPL CALC-SCNC: 6 MMOL/L — SIGNIFICANT CHANGE UP (ref 5–17)
ANION GAP SERPL CALC-SCNC: 6 MMOL/L — SIGNIFICANT CHANGE UP (ref 5–17)
APPEARANCE UR: CLEAR — SIGNIFICANT CHANGE UP
APPEARANCE UR: CLEAR — SIGNIFICANT CHANGE UP
AST SERPL-CCNC: 28 U/L — SIGNIFICANT CHANGE UP (ref 15–37)
AST SERPL-CCNC: 28 U/L — SIGNIFICANT CHANGE UP (ref 15–37)
BASOPHILS # BLD AUTO: 0.04 K/UL — SIGNIFICANT CHANGE UP (ref 0–0.2)
BASOPHILS # BLD AUTO: 0.04 K/UL — SIGNIFICANT CHANGE UP (ref 0–0.2)
BASOPHILS NFR BLD AUTO: 0.7 % — SIGNIFICANT CHANGE UP (ref 0–2)
BASOPHILS NFR BLD AUTO: 0.7 % — SIGNIFICANT CHANGE UP (ref 0–2)
BILIRUB SERPL-MCNC: 0.5 MG/DL — SIGNIFICANT CHANGE UP (ref 0.2–1.2)
BILIRUB SERPL-MCNC: 0.5 MG/DL — SIGNIFICANT CHANGE UP (ref 0.2–1.2)
BILIRUB UR-MCNC: NEGATIVE — SIGNIFICANT CHANGE UP
BILIRUB UR-MCNC: NEGATIVE — SIGNIFICANT CHANGE UP
BUN SERPL-MCNC: 15 MG/DL — SIGNIFICANT CHANGE UP (ref 7–23)
BUN SERPL-MCNC: 15 MG/DL — SIGNIFICANT CHANGE UP (ref 7–23)
CALCIUM SERPL-MCNC: 8.9 MG/DL — SIGNIFICANT CHANGE UP (ref 8.5–10.1)
CALCIUM SERPL-MCNC: 8.9 MG/DL — SIGNIFICANT CHANGE UP (ref 8.5–10.1)
CHLORIDE SERPL-SCNC: 107 MMOL/L — SIGNIFICANT CHANGE UP (ref 96–108)
CHLORIDE SERPL-SCNC: 107 MMOL/L — SIGNIFICANT CHANGE UP (ref 96–108)
CO2 SERPL-SCNC: 28 MMOL/L — SIGNIFICANT CHANGE UP (ref 22–31)
CO2 SERPL-SCNC: 28 MMOL/L — SIGNIFICANT CHANGE UP (ref 22–31)
COLOR SPEC: YELLOW — SIGNIFICANT CHANGE UP
COLOR SPEC: YELLOW — SIGNIFICANT CHANGE UP
CREAT SERPL-MCNC: 0.7 MG/DL — SIGNIFICANT CHANGE UP (ref 0.5–1.3)
CREAT SERPL-MCNC: 0.7 MG/DL — SIGNIFICANT CHANGE UP (ref 0.5–1.3)
DIFF PNL FLD: NEGATIVE — SIGNIFICANT CHANGE UP
DIFF PNL FLD: NEGATIVE — SIGNIFICANT CHANGE UP
EGFR: 102 ML/MIN/1.73M2 — SIGNIFICANT CHANGE UP
EGFR: 102 ML/MIN/1.73M2 — SIGNIFICANT CHANGE UP
EOSINOPHIL # BLD AUTO: 0.08 K/UL — SIGNIFICANT CHANGE UP (ref 0–0.5)
EOSINOPHIL # BLD AUTO: 0.08 K/UL — SIGNIFICANT CHANGE UP (ref 0–0.5)
EOSINOPHIL NFR BLD AUTO: 1.4 % — SIGNIFICANT CHANGE UP (ref 0–6)
EOSINOPHIL NFR BLD AUTO: 1.4 % — SIGNIFICANT CHANGE UP (ref 0–6)
GLUCOSE SERPL-MCNC: 95 MG/DL — SIGNIFICANT CHANGE UP (ref 70–99)
GLUCOSE SERPL-MCNC: 95 MG/DL — SIGNIFICANT CHANGE UP (ref 70–99)
GLUCOSE UR QL: NEGATIVE MG/DL — SIGNIFICANT CHANGE UP
GLUCOSE UR QL: NEGATIVE MG/DL — SIGNIFICANT CHANGE UP
HCG SERPL-ACNC: 3 MIU/ML — SIGNIFICANT CHANGE UP
HCG SERPL-ACNC: 3 MIU/ML — SIGNIFICANT CHANGE UP
HCT VFR BLD CALC: 38.3 % — SIGNIFICANT CHANGE UP (ref 34.5–45)
HCT VFR BLD CALC: 38.3 % — SIGNIFICANT CHANGE UP (ref 34.5–45)
HGB BLD-MCNC: 12.9 G/DL — SIGNIFICANT CHANGE UP (ref 11.5–15.5)
HGB BLD-MCNC: 12.9 G/DL — SIGNIFICANT CHANGE UP (ref 11.5–15.5)
IMM GRANULOCYTES NFR BLD AUTO: 0.2 % — SIGNIFICANT CHANGE UP (ref 0–0.9)
IMM GRANULOCYTES NFR BLD AUTO: 0.2 % — SIGNIFICANT CHANGE UP (ref 0–0.9)
KETONES UR-MCNC: ABNORMAL MG/DL
KETONES UR-MCNC: ABNORMAL MG/DL
LEUKOCYTE ESTERASE UR-ACNC: NEGATIVE — SIGNIFICANT CHANGE UP
LEUKOCYTE ESTERASE UR-ACNC: NEGATIVE — SIGNIFICANT CHANGE UP
LIDOCAIN IGE QN: 38 U/L — SIGNIFICANT CHANGE UP (ref 13–75)
LIDOCAIN IGE QN: 38 U/L — SIGNIFICANT CHANGE UP (ref 13–75)
LYMPHOCYTES # BLD AUTO: 1.35 K/UL — SIGNIFICANT CHANGE UP (ref 1–3.3)
LYMPHOCYTES # BLD AUTO: 1.35 K/UL — SIGNIFICANT CHANGE UP (ref 1–3.3)
LYMPHOCYTES # BLD AUTO: 23.3 % — SIGNIFICANT CHANGE UP (ref 13–44)
LYMPHOCYTES # BLD AUTO: 23.3 % — SIGNIFICANT CHANGE UP (ref 13–44)
MCHC RBC-ENTMCNC: 30.1 PG — SIGNIFICANT CHANGE UP (ref 27–34)
MCHC RBC-ENTMCNC: 30.1 PG — SIGNIFICANT CHANGE UP (ref 27–34)
MCHC RBC-ENTMCNC: 33.7 GM/DL — SIGNIFICANT CHANGE UP (ref 32–36)
MCHC RBC-ENTMCNC: 33.7 GM/DL — SIGNIFICANT CHANGE UP (ref 32–36)
MCV RBC AUTO: 89.3 FL — SIGNIFICANT CHANGE UP (ref 80–100)
MCV RBC AUTO: 89.3 FL — SIGNIFICANT CHANGE UP (ref 80–100)
MONOCYTES # BLD AUTO: 0.49 K/UL — SIGNIFICANT CHANGE UP (ref 0–0.9)
MONOCYTES # BLD AUTO: 0.49 K/UL — SIGNIFICANT CHANGE UP (ref 0–0.9)
MONOCYTES NFR BLD AUTO: 8.5 % — SIGNIFICANT CHANGE UP (ref 2–14)
MONOCYTES NFR BLD AUTO: 8.5 % — SIGNIFICANT CHANGE UP (ref 2–14)
NEUTROPHILS # BLD AUTO: 3.82 K/UL — SIGNIFICANT CHANGE UP (ref 1.8–7.4)
NEUTROPHILS # BLD AUTO: 3.82 K/UL — SIGNIFICANT CHANGE UP (ref 1.8–7.4)
NEUTROPHILS NFR BLD AUTO: 65.9 % — SIGNIFICANT CHANGE UP (ref 43–77)
NEUTROPHILS NFR BLD AUTO: 65.9 % — SIGNIFICANT CHANGE UP (ref 43–77)
NITRITE UR-MCNC: NEGATIVE — SIGNIFICANT CHANGE UP
NITRITE UR-MCNC: NEGATIVE — SIGNIFICANT CHANGE UP
PH UR: 8.5 (ref 5–8)
PH UR: 8.5 (ref 5–8)
PLATELET # BLD AUTO: 272 K/UL — SIGNIFICANT CHANGE UP (ref 150–400)
PLATELET # BLD AUTO: 272 K/UL — SIGNIFICANT CHANGE UP (ref 150–400)
POTASSIUM SERPL-MCNC: 3.5 MMOL/L — SIGNIFICANT CHANGE UP (ref 3.5–5.3)
POTASSIUM SERPL-MCNC: 3.5 MMOL/L — SIGNIFICANT CHANGE UP (ref 3.5–5.3)
POTASSIUM SERPL-SCNC: 3.5 MMOL/L — SIGNIFICANT CHANGE UP (ref 3.5–5.3)
POTASSIUM SERPL-SCNC: 3.5 MMOL/L — SIGNIFICANT CHANGE UP (ref 3.5–5.3)
PROT SERPL-MCNC: 8.4 GM/DL — HIGH (ref 6–8.3)
PROT SERPL-MCNC: 8.4 GM/DL — HIGH (ref 6–8.3)
PROT UR-MCNC: NEGATIVE MG/DL — SIGNIFICANT CHANGE UP
PROT UR-MCNC: NEGATIVE MG/DL — SIGNIFICANT CHANGE UP
RBC # BLD: 4.29 M/UL — SIGNIFICANT CHANGE UP (ref 3.8–5.2)
RBC # BLD: 4.29 M/UL — SIGNIFICANT CHANGE UP (ref 3.8–5.2)
RBC # FLD: 12.7 % — SIGNIFICANT CHANGE UP (ref 10.3–14.5)
RBC # FLD: 12.7 % — SIGNIFICANT CHANGE UP (ref 10.3–14.5)
SODIUM SERPL-SCNC: 141 MMOL/L — SIGNIFICANT CHANGE UP (ref 135–145)
SODIUM SERPL-SCNC: 141 MMOL/L — SIGNIFICANT CHANGE UP (ref 135–145)
SP GR SPEC: 1.01 — SIGNIFICANT CHANGE UP (ref 1–1.03)
SP GR SPEC: 1.01 — SIGNIFICANT CHANGE UP (ref 1–1.03)
UROBILINOGEN FLD QL: 0.2 MG/DL — SIGNIFICANT CHANGE UP (ref 0.2–1)
UROBILINOGEN FLD QL: 0.2 MG/DL — SIGNIFICANT CHANGE UP (ref 0.2–1)
WBC # BLD: 5.79 K/UL — SIGNIFICANT CHANGE UP (ref 3.8–10.5)
WBC # BLD: 5.79 K/UL — SIGNIFICANT CHANGE UP (ref 3.8–10.5)
WBC # FLD AUTO: 5.79 K/UL — SIGNIFICANT CHANGE UP (ref 3.8–10.5)
WBC # FLD AUTO: 5.79 K/UL — SIGNIFICANT CHANGE UP (ref 3.8–10.5)

## 2023-11-01 PROCEDURE — 84702 CHORIONIC GONADOTROPIN TEST: CPT

## 2023-11-01 PROCEDURE — 99285 EMERGENCY DEPT VISIT HI MDM: CPT

## 2023-11-01 PROCEDURE — 85025 COMPLETE CBC W/AUTO DIFF WBC: CPT

## 2023-11-01 PROCEDURE — 80053 COMPREHEN METABOLIC PANEL: CPT

## 2023-11-01 PROCEDURE — 99284 EMERGENCY DEPT VISIT MOD MDM: CPT | Mod: 25

## 2023-11-01 PROCEDURE — 74177 CT ABD & PELVIS W/CONTRAST: CPT | Mod: 26,MA

## 2023-11-01 PROCEDURE — 96375 TX/PRO/DX INJ NEW DRUG ADDON: CPT

## 2023-11-01 PROCEDURE — 96374 THER/PROPH/DIAG INJ IV PUSH: CPT | Mod: XU

## 2023-11-01 PROCEDURE — 74177 CT ABD & PELVIS W/CONTRAST: CPT | Mod: MA

## 2023-11-01 PROCEDURE — 83690 ASSAY OF LIPASE: CPT

## 2023-11-01 PROCEDURE — 36415 COLL VENOUS BLD VENIPUNCTURE: CPT

## 2023-11-01 PROCEDURE — 81003 URINALYSIS AUTO W/O SCOPE: CPT

## 2023-11-01 PROCEDURE — 87086 URINE CULTURE/COLONY COUNT: CPT

## 2023-11-01 RX ORDER — ONDANSETRON 8 MG/1
4 TABLET, FILM COATED ORAL ONCE
Refills: 0 | Status: COMPLETED | OUTPATIENT
Start: 2023-11-01 | End: 2023-11-01

## 2023-11-01 RX ORDER — ACETAMINOPHEN 500 MG
1000 TABLET ORAL ONCE
Refills: 0 | Status: COMPLETED | OUTPATIENT
Start: 2023-11-01 | End: 2023-11-01

## 2023-11-01 RX ORDER — ONDANSETRON 8 MG/1
1 TABLET, FILM COATED ORAL
Qty: 12 | Refills: 0
Start: 2023-11-01

## 2023-11-01 RX ORDER — SODIUM CHLORIDE 9 MG/ML
1000 INJECTION INTRAMUSCULAR; INTRAVENOUS; SUBCUTANEOUS ONCE
Refills: 0 | Status: COMPLETED | OUTPATIENT
Start: 2023-11-01 | End: 2023-11-01

## 2023-11-01 RX ORDER — FAMOTIDINE 10 MG/ML
20 INJECTION INTRAVENOUS ONCE
Refills: 0 | Status: COMPLETED | OUTPATIENT
Start: 2023-11-01 | End: 2023-11-01

## 2023-11-01 RX ADMIN — FAMOTIDINE 20 MILLIGRAM(S): 10 INJECTION INTRAVENOUS at 10:49

## 2023-11-01 RX ADMIN — SODIUM CHLORIDE 1000 MILLILITER(S): 9 INJECTION INTRAMUSCULAR; INTRAVENOUS; SUBCUTANEOUS at 11:10

## 2023-11-01 RX ADMIN — Medication 400 MILLIGRAM(S): at 10:49

## 2023-11-01 RX ADMIN — ONDANSETRON 4 MILLIGRAM(S): 8 TABLET, FILM COATED ORAL at 10:49

## 2023-11-01 RX ADMIN — Medication 1000 MILLIGRAM(S): at 14:12

## 2023-11-01 RX ADMIN — SODIUM CHLORIDE 1000 MILLILITER(S): 9 INJECTION INTRAMUSCULAR; INTRAVENOUS; SUBCUTANEOUS at 14:12

## 2023-11-01 RX ADMIN — ONDANSETRON 4 MILLIGRAM(S): 8 TABLET, FILM COATED ORAL at 14:10

## 2023-11-01 NOTE — ED PROVIDER NOTE - CARE PLAN
1 Principal Discharge DX:	Nausea and vomiting  Secondary Diagnosis:	Constipation  Secondary Diagnosis:	Hydronephrosis

## 2023-11-01 NOTE — ED PROVIDER NOTE - CARE PROVIDER_API CALL
Drew Bullock  Urology  [address], [address]  [place] [address]  Phone: [phone]  Fax: [phone]  Follow Up Time: 4-6 Days    Cristina Snyder  Gastroenterology  [address] [place] [address], [address]  [place] [address]  Phone: [phone]  Fax: [phone]  Follow Up Time: 1-3 Days

## 2023-11-01 NOTE — ED PROVIDER NOTE - PROVIDER TOKENS
PROVIDER:[TOKEN:[[id number]],FOLLOWUP:[4-6 Days]],PROVIDER:[TOKEN:[[id number]],FOLLOWUP:[1-3 Days]]

## 2023-11-01 NOTE — ED PROVIDER NOTE - PROGRESS NOTE DETAILS
Patient reevaluated, informed of findings of bilateral hydronephrosis which she states she had known about before and follows with urology but has not seen them for a couple of years.  Also informed her of constipation which she is aware of.  Urinalysis negative for infection.  Given some improvement in symptoms, will plan for discharge at this time.  Patient feels better with hydration.  We will send medication to pharmacy.  Plan to discharge patient. Return to ED precautions were discussed with the patient/family. All questions were answered. Jcarlos Moore MD.

## 2023-11-01 NOTE — ED PROVIDER NOTE - CLINICAL SUMMARY MEDICAL DECISION MAKING FREE TEXT BOX
Possible foodborne gastritis given nausea and vomiting starting after eating yesterday and worsening through toady. However with history of abdomen surgeries, will check scan for obstruction, possible pancreatitis as well. Will assess for MIRACLE, dehydration, as well as UTI. Plan on labs, imaging, and meds. Possible foodborne gastritis given nausea and vomiting starting after eating yesterday and worsening through today. However with history of abdomen surgeries, will check scan for obstruction, possible pancreatitis as well. Will assess for MIRACLE, dehydration, as well as UTI. Plan on labs, imaging, and meds.

## 2023-11-01 NOTE — ED PROVIDER NOTE - PATIENT PORTAL LINK FT
You can access the FollowMyHealth Patient Portal offered by [facility] by registering at the following website: [email or web address]. By joining [facility]’s FollowMyHealth portal, you will also be able to view your health information using other applications (apps) compatible with our system.

## 2023-11-01 NOTE — ED PROVIDER NOTE - PATIENT'S PREFERRED PRONOUN
Her/She
Have Your Skin Lesions Been Treated?: not been treated
Is This A New Presentation, Or A Follow-Up?: Skin Lesions
How Severe Is Your Skin Lesion?: mild

## 2023-11-01 NOTE — ED PROVIDER NOTE - OBJECTIVE STATEMENT
56 yo female w/PMHx Hypothyroidism, Prediabetes on Metformin, HPV, and melanoma presents to the ED c/o n/v onset last night. Pt also endorsing HA and chills. Pt states last night she was vomiting every 30 minutes. At this point in time pt reports she is dry heaving as there is nothing left in her stomach. Pt c/o pain from her teeth up to her forehead on the front side of her face. HA began middle of last night, after her second episode of vomiting. Pt went to bed nauseous, suspected it was due to a heavy meal she ate for dinner and candy last night. Pt called  this morning to see if she could get IV Tylenol and Zofran and was told she would need to go to her pharmacy for the Zofran. No associated abdominal pain. Pt did not take her Metformin or hypothyroid medication this morning. Pt endorsing she has had GI issues throughout the last few years, is on Omeprazole for it currently. Pt states each time she had to vomit she felt the need to urinate as well. 3 weeks ago pt had a stomach virus that led to associated diarrhea, but has since resolved. Pt states the nausea is more severe when she lays down and closes her eyes. No history of obstruction.

## 2023-11-01 NOTE — ED PROVIDER NOTE - PHYSICAL EXAMINATION
Constitutional: Awake, Alert, non-toxic. No acute distress.  HEAD: Normocephalic, atraumatic.   EYES: PERRL, EOM intact, conjunctiva and sclera are clear bilaterally.  ENT: External ears normal. No rhinorrhea, no tracheal deviation   NECK: Supple, non-tender  CARDIOVASCULAR: tachycardic rate and rhythm.  RESPIRATORY: Normal respiratory effort; breath sounds CTAB, no wheezes, rhonchi, or rales. Speaking in full sentences. No accessory muscle use.   ABDOMEN: Soft; non-distended. No rebound or guarding. mild generalized abdominal ttp  MSK:  no lower extremity edema, no deformities  SKIN: Warm, dry  NEURO: A&O x3. Sensory and motor functions are grossly intact. Speech is normal. No facial droop.  PSYCH: Appearance and judgement seem appropriate for gender and age.

## 2023-11-01 NOTE — ED ADULT NURSE NOTE - NSFALLUNIVINTERV_ED_ALL_ED
Bed/Stretcher in lowest position, wheels locked, appropriate side rails in place/Call bell, personal items and telephone in reach/Instruct patient to call for assistance before getting out of bed/chair/stretcher/Non-slip footwear applied when patient is off stretcher/[place] to call system/Physically safe environment - no spills, clutter or unnecessary equipment/Purposeful proactive rounding/Room/bathroom lighting operational, light cord in reach

## 2023-11-02 LAB
CULTURE RESULTS: SIGNIFICANT CHANGE UP
CULTURE RESULTS: SIGNIFICANT CHANGE UP
SPECIMEN SOURCE: SIGNIFICANT CHANGE UP
SPECIMEN SOURCE: SIGNIFICANT CHANGE UP

## 2023-11-09 RX ORDER — FLUCONAZOLE 100 MG/1
100 TABLET ORAL
Qty: 3 | Refills: 0 | Status: ACTIVE | COMMUNITY
Start: 2023-02-16

## 2023-11-20 ENCOUNTER — OUTPATIENT (OUTPATIENT)
Dept: OUTPATIENT SERVICES | Facility: HOSPITAL | Age: 58
LOS: 1 days | End: 2023-11-20
Payer: COMMERCIAL

## 2023-11-20 ENCOUNTER — APPOINTMENT (OUTPATIENT)
Dept: UROLOGY | Facility: CLINIC | Age: 58
End: 2023-11-20
Payer: COMMERCIAL

## 2023-11-20 ENCOUNTER — APPOINTMENT (OUTPATIENT)
Dept: ULTRASOUND IMAGING | Facility: CLINIC | Age: 58
End: 2023-11-20
Payer: COMMERCIAL

## 2023-11-20 VITALS
DIASTOLIC BLOOD PRESSURE: 65 MMHG | HEIGHT: 67 IN | SYSTOLIC BLOOD PRESSURE: 100 MMHG | BODY MASS INDEX: 21.97 KG/M2 | WEIGHT: 140 LBS

## 2023-11-20 DIAGNOSIS — N13.30 UNSPECIFIED HYDRONEPHROSIS: ICD-10-CM

## 2023-11-20 DIAGNOSIS — Z78.9 OTHER SPECIFIED HEALTH STATUS: ICD-10-CM

## 2023-11-20 DIAGNOSIS — Z90.710 ACQUIRED ABSENCE OF BOTH CERVIX AND UTERUS: Chronic | ICD-10-CM

## 2023-11-20 DIAGNOSIS — R31.29 OTHER MICROSCOPIC HEMATURIA: ICD-10-CM

## 2023-11-20 DIAGNOSIS — Z98.890 OTHER SPECIFIED POSTPROCEDURAL STATES: Chronic | ICD-10-CM

## 2023-11-20 DIAGNOSIS — Z87.442 PERSONAL HISTORY OF URINARY CALCULI: ICD-10-CM

## 2023-11-20 DIAGNOSIS — Z87.891 PERSONAL HISTORY OF NICOTINE DEPENDENCE: ICD-10-CM

## 2023-11-20 PROCEDURE — 76770 US EXAM ABDO BACK WALL COMP: CPT | Mod: 26

## 2023-11-20 PROCEDURE — 76770 US EXAM ABDO BACK WALL COMP: CPT

## 2023-11-20 PROCEDURE — 99204 OFFICE O/P NEW MOD 45 MIN: CPT

## 2023-11-20 RX ORDER — METFORMIN HYDROCHLORIDE 625 MG/1
TABLET ORAL
Refills: 0 | Status: ACTIVE | COMMUNITY

## 2023-11-20 RX ORDER — OMEPRAZOLE 10 MG/1
10 CAPSULE, DELAYED RELEASE ORAL
Refills: 0 | Status: ACTIVE | COMMUNITY

## 2023-11-20 RX ORDER — LEVOTHYROXINE SODIUM 75 UG/1
75 TABLET ORAL
Refills: 0 | Status: ACTIVE | COMMUNITY

## 2023-11-21 LAB
ANION GAP SERPL CALC-SCNC: 7 MMOL/L
APPEARANCE: ABNORMAL
BACTERIA: NEGATIVE /HPF
BILIRUBIN URINE: NEGATIVE
BLOOD URINE: NEGATIVE
BUN SERPL-MCNC: 20 MG/DL
CALCIUM SERPL-MCNC: 9.8 MG/DL
CAST: 2 /LPF
CHLORIDE SERPL-SCNC: 105 MMOL/L
CO2 SERPL-SCNC: 28 MMOL/L
COLOR: YELLOW
CREAT SERPL-MCNC: 0.7 MG/DL
EGFR: 102 ML/MIN/1.73M2
EPITHELIAL CELLS: 4 /HPF
GLUCOSE QUALITATIVE U: NEGATIVE MG/DL
GLUCOSE SERPL-MCNC: 91 MG/DL
KETONES URINE: NEGATIVE MG/DL
LEUKOCYTE ESTERASE URINE: NEGATIVE
MICROSCOPIC-UA: NORMAL
NITRITE URINE: NEGATIVE
PH URINE: 5.5
POTASSIUM SERPL-SCNC: 4.6 MMOL/L
PROTEIN URINE: NEGATIVE MG/DL
RED BLOOD CELLS URINE: 1 /HPF
SODIUM SERPL-SCNC: 140 MMOL/L
SPECIFIC GRAVITY URINE: 1.03
UROBILINOGEN URINE: 0.2 MG/DL
WHITE BLOOD CELLS URINE: 1 /HPF

## 2023-11-22 LAB — BACTERIA UR CULT: NORMAL

## 2023-12-06 ENCOUNTER — RX ONLY (RX ONLY)
Age: 58
End: 2023-12-06

## 2023-12-06 ENCOUNTER — OFFICE (OUTPATIENT)
Dept: URBAN - METROPOLITAN AREA CLINIC 102 | Facility: CLINIC | Age: 58
Setting detail: OPHTHALMOLOGY
End: 2023-12-06
Payer: MEDICAID

## 2023-12-06 DIAGNOSIS — H25.13: ICD-10-CM

## 2023-12-06 DIAGNOSIS — H16.223: ICD-10-CM

## 2023-12-06 DIAGNOSIS — D31.30: ICD-10-CM

## 2023-12-06 DIAGNOSIS — H01.001: ICD-10-CM

## 2023-12-06 DIAGNOSIS — H43.393: ICD-10-CM

## 2023-12-06 PROCEDURE — 83861 MICROFLUID ANALY TEARS: CPT | Performed by: OPHTHALMOLOGY

## 2023-12-06 PROCEDURE — 92014 COMPRE OPH EXAM EST PT 1/>: CPT | Performed by: OPHTHALMOLOGY

## 2023-12-06 PROCEDURE — 92250 FUNDUS PHOTOGRAPHY W/I&R: CPT | Performed by: OPHTHALMOLOGY

## 2023-12-06 ASSESSMENT — SPHEQUIV_DERIVED
OS_SPHEQUIV: -0.5
OS_SPHEQUIV: 0.125
OD_SPHEQUIV: -0.875
OD_SPHEQUIV: -0.5
OD_SPHEQUIV: -0.75

## 2023-12-06 ASSESSMENT — REFRACTION_MANIFEST
OD_VA1: 20/20
OS_ADD: +2.50
OS_CYLINDER: -0.50
OD_CYLINDER: -0.25
OD_SPHERE: -0.75
OS_AXIS: 95
OD_SPHERE: -0.50
OD_AXIS: 083
OS_AXIS: 100
OS_SPHERE: PLANO
OS_SPHERE: -0.25
OS_ADD: +2.50
OS_CYLINDER: -0.75
OS_VA1: 20/20
OD_CYLINDER: -0.50
OD_VA1: 20/20
OD_ADD: +2.50
OU_VA: 20/20
OD_AXIS: 95
OS_VA1: 20/20
OD_ADD: +2.50

## 2023-12-06 ASSESSMENT — REFRACTION_CURRENTRX
OS_CYLINDER: -0.50
OD_ADD: +2.25
OS_OVR_VA: 20/
OD_VPRISM_DIRECTION: BF
OS_VPRISM_DIRECTION: BF
OD_AXIS: 095
OD_OVR_VA: 20/
OS_ADD: +2.25
OS_SPHERE: -0.25
OD_CYLINDER: -0.50
OD_SPHERE: -1.00
OS_AXIS: 095

## 2023-12-06 ASSESSMENT — LID EXAM ASSESSMENTS
OS_BLEPHARITIS: LLL LUL 3+
OD_BLEPHARITIS: RLL RUL 3+

## 2023-12-06 ASSESSMENT — REFRACTION_AUTOREFRACTION
OD_AXIS: 090
OS_AXIS: 109
OS_CYLINDER: -0.75
OD_SPHERE: -0.25
OD_CYLINDER: -0.50
OS_SPHERE: +0.50

## 2023-12-06 ASSESSMENT — CONFRONTATIONAL VISUAL FIELD TEST (CVF)
OS_FINDINGS: FULL
OD_FINDINGS: FULL

## 2023-12-06 ASSESSMENT — DECREASING TEAR LAKE - SEVERITY SCORE
OS_DEC_TEARLAKE: 2+
OD_DEC_TEARLAKE: 2+

## 2023-12-12 ENCOUNTER — APPOINTMENT (OUTPATIENT)
Dept: OBGYN | Facility: CLINIC | Age: 58
End: 2023-12-12

## 2023-12-12 ENCOUNTER — APPOINTMENT (OUTPATIENT)
Dept: OBGYN | Facility: CLINIC | Age: 58
End: 2023-12-12
Payer: COMMERCIAL

## 2023-12-12 PROCEDURE — 99213 OFFICE O/P EST LOW 20 MIN: CPT | Mod: 25

## 2023-12-12 RX ORDER — METRONIDAZOLE 500 MG/1
500 TABLET ORAL TWICE DAILY
Qty: 14 | Refills: 0 | Status: ACTIVE | COMMUNITY
Start: 2023-12-12 | End: 1900-01-01

## 2023-12-12 RX ORDER — FLUCONAZOLE 150 MG/1
150 TABLET ORAL
Qty: 2 | Refills: 0 | Status: ACTIVE | COMMUNITY
Start: 2023-12-12 | End: 1900-01-01

## 2023-12-13 NOTE — ED ADULT NURSE NOTE - DISCHARGE DATE/TIME
Duplicate - prescription request refused.  
Pending Prescriptions:                       Disp   Refills    omeprazole (PRILOSEC OTC) 20 MG EC tablet 90 tab*2            Sig: Take 1 tablet (20 mg) by mouth daily      
01-Nov-2023 16:00

## 2023-12-18 ENCOUNTER — NON-APPOINTMENT (OUTPATIENT)
Age: 58
End: 2023-12-18

## 2023-12-18 LAB
A VAGINAE DNA VAG QL NAA+PROBE: NORMAL
BVAB2 DNA VAG QL NAA+PROBE: NORMAL
C KRUSEI DNA VAG QL NAA+PROBE: NEGATIVE
C TRACH RRNA SPEC QL NAA+PROBE: NEGATIVE
CANDIDA DNA VAG QL NAA+PROBE: NEGATIVE
CYTOLOGY CVX/VAG DOC THIN PREP: NORMAL
MEGA1 DNA VAG QL NAA+PROBE: NORMAL
N GONORRHOEA RRNA SPEC QL NAA+PROBE: NEGATIVE
T VAGINALIS RRNA SPEC QL NAA+PROBE: NEGATIVE

## 2023-12-19 DIAGNOSIS — N89.8 OTHER SPECIFIED NONINFLAMMATORY DISORDERS OF VAGINA: ICD-10-CM

## 2023-12-20 RX ORDER — FLUCONAZOLE 100 MG/1
100 TABLET ORAL
Qty: 3 | Refills: 0 | Status: ACTIVE | COMMUNITY
Start: 2023-12-19

## 2024-02-22 NOTE — ED ADULT TRIAGE NOTE - DIRECT TO ROOM CARE INITIATED:
Head, normocephalic, atraumatic, Face, Face within normal limits, Ears, External ears within normal limits , Head, normocephalic, atraumatic, Face, Face within normal limits, Ears, External ears within normal limits, Nose/Nasopharynx, External nose normal appearance, nares patent, no nasal discharge, Mouth and Throat, Oral cavity appearance normal, Lips, Appearance normal
11-Apr-2019 09:24

## 2024-05-28 PROBLEM — Z12.4 CERVICAL CANCER SCREENING: Status: ACTIVE | Noted: 2024-05-28

## 2024-05-28 PROBLEM — Z12.11 COLON CANCER SCREENING: Status: ACTIVE | Noted: 2024-05-28

## 2024-05-31 ENCOUNTER — RX RENEWAL (OUTPATIENT)
Age: 59
End: 2024-05-31

## 2024-05-31 RX ORDER — ESTRADIOL 1 MG/1
1 TABLET ORAL
Qty: 90 | Refills: 3 | Status: ACTIVE | COMMUNITY
Start: 2023-02-16 | End: 1900-01-01

## 2024-06-04 ENCOUNTER — APPOINTMENT (OUTPATIENT)
Dept: OBGYN | Facility: CLINIC | Age: 59
End: 2024-06-04

## 2024-06-04 DIAGNOSIS — Z12.11 ENCOUNTER FOR SCREENING FOR MALIGNANT NEOPLASM OF COLON: ICD-10-CM

## 2024-06-04 DIAGNOSIS — Z12.4 ENCOUNTER FOR SCREENING FOR MALIGNANT NEOPLASM OF CERVIX: ICD-10-CM

## 2024-06-12 ENCOUNTER — OFFICE (OUTPATIENT)
Dept: URBAN - METROPOLITAN AREA CLINIC 102 | Facility: CLINIC | Age: 59
Setting detail: OPHTHALMOLOGY
End: 2024-06-12
Payer: MEDICAID

## 2024-06-12 DIAGNOSIS — H01.002: ICD-10-CM

## 2024-06-12 DIAGNOSIS — D31.30: ICD-10-CM

## 2024-06-12 DIAGNOSIS — H01.004: ICD-10-CM

## 2024-06-12 DIAGNOSIS — H01.001: ICD-10-CM

## 2024-06-12 DIAGNOSIS — H25.13: ICD-10-CM

## 2024-06-12 DIAGNOSIS — H43.393: ICD-10-CM

## 2024-06-12 DIAGNOSIS — D31.42: ICD-10-CM

## 2024-06-12 DIAGNOSIS — H01.005: ICD-10-CM

## 2024-06-12 DIAGNOSIS — H16.223: ICD-10-CM

## 2024-06-12 PROCEDURE — 92014 COMPRE OPH EXAM EST PT 1/>: CPT | Performed by: OPHTHALMOLOGY

## 2024-06-12 PROCEDURE — 92020 GONIOSCOPY: CPT | Performed by: OPHTHALMOLOGY

## 2024-06-12 PROCEDURE — 92285 EXTERNAL OCULAR PHOTOGRAPHY: CPT | Performed by: OPHTHALMOLOGY

## 2024-06-12 PROCEDURE — 92250 FUNDUS PHOTOGRAPHY W/I&R: CPT | Performed by: OPHTHALMOLOGY

## 2024-06-12 ASSESSMENT — CONFRONTATIONAL VISUAL FIELD TEST (CVF)
OD_FINDINGS: FULL
OS_FINDINGS: FULL

## 2024-06-12 ASSESSMENT — LID EXAM ASSESSMENTS
OS_BLEPHARITIS: LLL LUL 3+
OD_BLEPHARITIS: RLL RUL 3+

## 2024-11-06 ENCOUNTER — LABORATORY RESULT (OUTPATIENT)
Age: 59
End: 2024-11-06

## 2024-11-06 ENCOUNTER — APPOINTMENT (OUTPATIENT)
Dept: OBGYN | Facility: CLINIC | Age: 59
End: 2024-11-06

## 2024-11-06 VITALS
BODY MASS INDEX: 22.76 KG/M2 | WEIGHT: 145 LBS | SYSTOLIC BLOOD PRESSURE: 95 MMHG | HEIGHT: 67 IN | HEART RATE: 71 BPM | DIASTOLIC BLOOD PRESSURE: 63 MMHG

## 2024-11-06 DIAGNOSIS — Z13.820 ENCOUNTER FOR SCREENING FOR OSTEOPOROSIS: ICD-10-CM

## 2024-11-06 DIAGNOSIS — N89.8 OTHER SPECIFIED NONINFLAMMATORY DISORDERS OF VAGINA: ICD-10-CM

## 2024-11-06 DIAGNOSIS — Z00.00 ENCOUNTER FOR GENERAL ADULT MEDICAL EXAMINATION W/OUT ABNORMAL FINDINGS: ICD-10-CM

## 2024-11-06 DIAGNOSIS — N95.2 POSTMENOPAUSAL ATROPHIC VAGINITIS: ICD-10-CM

## 2024-11-06 DIAGNOSIS — Z01.419 ENCOUNTER FOR GYNECOLOGICAL EXAMINATION (GENERAL) (ROUTINE) W/OUT ABNORMAL FINDINGS: ICD-10-CM

## 2024-11-06 DIAGNOSIS — R92.30 DENSE BREASTS, UNSPECIFIED: ICD-10-CM

## 2024-11-06 DIAGNOSIS — Z12.31 ENCOUNTER FOR SCREENING MAMMOGRAM FOR MALIGNANT NEOPLASM OF BREAST: ICD-10-CM

## 2024-11-06 DIAGNOSIS — Z12.4 ENCOUNTER FOR SCREENING FOR MALIGNANT NEOPLASM OF CERVIX: ICD-10-CM

## 2024-11-06 LAB
BILIRUB UR QL STRIP: NEGATIVE
CLARITY UR: CLEAR
COLLECTION METHOD: NORMAL
GLUCOSE UR-MCNC: NEGATIVE
HCG UR QL: 0 EU/DL
HEMOGLOBIN: 11.6
HGB UR QL STRIP.AUTO: NORMAL
KETONES UR-MCNC: NEGATIVE
LEUKOCYTE ESTERASE UR QL STRIP: NEGATIVE
NITRITE UR QL STRIP: NEGATIVE
PH UR STRIP: 5
PROT UR STRIP-MCNC: NORMAL
SP GR UR STRIP: 1

## 2024-11-06 PROCEDURE — 99459 PELVIC EXAMINATION: CPT

## 2024-11-06 PROCEDURE — 99396 PREV VISIT EST AGE 40-64: CPT

## 2024-11-06 PROCEDURE — 81003 URINALYSIS AUTO W/O SCOPE: CPT | Mod: QW

## 2024-11-06 PROCEDURE — 85018 HEMOGLOBIN: CPT | Mod: QW

## 2024-11-11 LAB
A VAGINAE DNA VAG QL NAA+PROBE: NORMAL
BVAB2 DNA VAG QL NAA+PROBE: NORMAL
C KRUSEI DNA VAG QL NAA+PROBE: NEGATIVE
CANDIDA DNA VAG QL NAA+PROBE: NEGATIVE
MEGA1 DNA VAG QL NAA+PROBE: NORMAL
T VAGINALIS RRNA SPEC QL NAA+PROBE: NEGATIVE

## 2025-01-20 ENCOUNTER — OFFICE (OUTPATIENT)
Dept: URBAN - METROPOLITAN AREA CLINIC 102 | Facility: CLINIC | Age: 60
Setting detail: OPHTHALMOLOGY
End: 2025-01-20
Payer: MEDICAID

## 2025-01-20 VITALS — HEIGHT: 55 IN

## 2025-01-20 DIAGNOSIS — H43.393: ICD-10-CM

## 2025-01-20 DIAGNOSIS — H25.13: ICD-10-CM

## 2025-01-20 DIAGNOSIS — H01.001: ICD-10-CM

## 2025-01-20 DIAGNOSIS — G43.109: ICD-10-CM

## 2025-01-20 DIAGNOSIS — H01.002: ICD-10-CM

## 2025-01-20 PROCEDURE — 92133 CPTRZD OPH DX IMG PST SGM ON: CPT | Performed by: OPHTHALMOLOGY

## 2025-01-20 PROCEDURE — 92014 COMPRE OPH EXAM EST PT 1/>: CPT | Performed by: OPHTHALMOLOGY

## 2025-01-20 PROCEDURE — 92083 EXTENDED VISUAL FIELD XM: CPT | Performed by: OPHTHALMOLOGY

## 2025-01-20 ASSESSMENT — REFRACTION_MANIFEST
OD_CYLINDER: -0.25
OU_VA: 20/20
OS_CYLINDER: -0.75
OD_CYLINDER: -0.25
OS_AXIS: 100
OU_VA: 20/20
OD_VA1: 20/20
OD_SPHERE: -0.75
OS_SPHERE: PLANO
OD_AXIS: 083
OS_VA1: 20/20
OS_ADD: +2.50
OS_SPHERE: PLANO
OS_ADD: +2.50
OD_SPHERE: -0.75
OD_AXIS: 083
OS_VA1: 20/20
OS_AXIS: 100
OD_VA1: 20/20
OS_CYLINDER: -0.75
OD_ADD: +2.50
OD_ADD: +2.50

## 2025-01-20 ASSESSMENT — DECREASING TEAR LAKE - SEVERITY SCORE
OS_DEC_TEARLAKE: 2+
OD_DEC_TEARLAKE: 2+

## 2025-01-20 ASSESSMENT — TONOMETRY
OD_IOP_MMHG: 12
OS_IOP_MMHG: 11

## 2025-01-20 ASSESSMENT — KERATOMETRY
OD_AXISANGLE_DEGREES: 150
OD_K1POWER_DIOPTERS: 44.50
OS_K2POWER_DIOPTERS: 45.25
OS_K1POWER_DIOPTERS: 44.75
OD_K2POWER_DIOPTERS: 45.00
OS_AXISANGLE_DEGREES: 045
METHOD_AUTO_MANUAL: AUTO

## 2025-01-20 ASSESSMENT — REFRACTION_CURRENTRX
OS_SPHERE: -0.25
OD_AXIS: 095
OD_VPRISM_DIRECTION: BF
OS_ADD: +2.25
OS_CYLINDER: -0.50
OS_VPRISM_DIRECTION: BF
OD_OVR_VA: 20/
OD_SPHERE: -1.00
OS_AXIS: 095
OD_CYLINDER: -0.50
OD_ADD: +2.25
OS_OVR_VA: 20/

## 2025-01-20 ASSESSMENT — VISUAL ACUITY
OS_BCVA: 20/20
OD_BCVA: 20/20

## 2025-01-20 ASSESSMENT — CONFRONTATIONAL VISUAL FIELD TEST (CVF)
OS_FINDINGS: FULL
OD_FINDINGS: FULL

## 2025-01-20 ASSESSMENT — REFRACTION_AUTOREFRACTION
OD_CYLINDER: -0.75
OD_AXIS: 087
OD_SPHERE: -0.50
OS_CYLINDER: -0.75
OS_SPHERE: +0.50
OS_AXIS: 112

## 2025-01-20 ASSESSMENT — LID EXAM ASSESSMENTS
OS_BLEPHARITIS: LLL LUL 3+
OD_BLEPHARITIS: RLL RUL 3+

## 2025-03-26 NOTE — ED ADULT TRIAGE NOTE - CHIEF COMPLAINT QUOTE
General c/o left lower abdominal pain radiating to left hip and mid lateral abdominal for past week, s/p abdominoplasty 4 weeks ago, finish dose antibiotics (bactrim) for incisional infection, decrease mobility due to pain HX: hashimotos thyroiditis